# Patient Record
Sex: FEMALE | Race: OTHER | HISPANIC OR LATINO
[De-identification: names, ages, dates, MRNs, and addresses within clinical notes are randomized per-mention and may not be internally consistent; named-entity substitution may affect disease eponyms.]

---

## 2017-01-24 ENCOUNTER — RESULT REVIEW (OUTPATIENT)
Age: 27
End: 2017-01-24

## 2017-01-25 ENCOUNTER — APPOINTMENT (OUTPATIENT)
Dept: FAMILY MEDICINE | Facility: HOSPITAL | Age: 27
End: 2017-01-25

## 2017-01-25 ENCOUNTER — OUTPATIENT (OUTPATIENT)
Dept: OUTPATIENT SERVICES | Facility: HOSPITAL | Age: 27
LOS: 1 days | End: 2017-01-25
Payer: SELF-PAY

## 2017-01-25 VITALS
OXYGEN SATURATION: 100 % | DIASTOLIC BLOOD PRESSURE: 80 MMHG | TEMPERATURE: 98.4 F | HEART RATE: 89 BPM | WEIGHT: 115 LBS | SYSTOLIC BLOOD PRESSURE: 125 MMHG | BODY MASS INDEX: 21.73 KG/M2 | RESPIRATION RATE: 16 BRPM

## 2017-01-25 PROCEDURE — 87591 N.GONORRHOEAE DNA AMP PROB: CPT

## 2017-01-25 PROCEDURE — G0463: CPT

## 2017-01-25 PROCEDURE — 88175 CYTOPATH C/V AUTO FLUID REDO: CPT

## 2017-01-25 PROCEDURE — 87491 CHLMYD TRACH DNA AMP PROBE: CPT

## 2017-02-07 LAB — CYTOLOGY CVX/VAG DOC THIN PREP: NORMAL

## 2017-02-08 ENCOUNTER — OUTPATIENT (OUTPATIENT)
Dept: OUTPATIENT SERVICES | Facility: HOSPITAL | Age: 27
LOS: 1 days | End: 2017-02-08
Payer: SELF-PAY

## 2017-02-08 ENCOUNTER — APPOINTMENT (OUTPATIENT)
Dept: FAMILY MEDICINE | Facility: HOSPITAL | Age: 27
End: 2017-02-08

## 2017-02-08 ENCOUNTER — MED ADMIN CHARGE (OUTPATIENT)
Age: 27
End: 2017-02-08

## 2017-02-08 VITALS
DIASTOLIC BLOOD PRESSURE: 72 MMHG | RESPIRATION RATE: 14 BRPM | OXYGEN SATURATION: 100 % | HEIGHT: 61 IN | TEMPERATURE: 97.1 F | BODY MASS INDEX: 22.28 KG/M2 | HEART RATE: 83 BPM | SYSTOLIC BLOOD PRESSURE: 112 MMHG | WEIGHT: 118 LBS

## 2017-02-08 DIAGNOSIS — Z00.00 ENCOUNTER FOR GENERAL ADULT MEDICAL EXAMINATION W/OUT ABNORMAL FINDINGS: ICD-10-CM

## 2017-02-08 PROCEDURE — G0463: CPT

## 2017-11-01 ENCOUNTER — OUTPATIENT (OUTPATIENT)
Dept: OUTPATIENT SERVICES | Facility: HOSPITAL | Age: 27
LOS: 1 days | End: 2017-11-01
Payer: SELF-PAY

## 2017-11-01 ENCOUNTER — APPOINTMENT (OUTPATIENT)
Dept: FAMILY MEDICINE | Facility: HOSPITAL | Age: 27
End: 2017-11-01

## 2017-11-01 VITALS
SYSTOLIC BLOOD PRESSURE: 123 MMHG | HEART RATE: 86 BPM | RESPIRATION RATE: 16 BRPM | BODY MASS INDEX: 22.67 KG/M2 | DIASTOLIC BLOOD PRESSURE: 81 MMHG | OXYGEN SATURATION: 100 % | WEIGHT: 120 LBS | TEMPERATURE: 98.8 F

## 2017-11-01 PROCEDURE — 87070 CULTURE OTHR SPECIMN AEROBIC: CPT

## 2017-11-01 PROCEDURE — 36415 COLL VENOUS BLD VENIPUNCTURE: CPT

## 2017-11-01 PROCEDURE — 87563 M. GENITALIUM AMP PROBE: CPT

## 2017-11-01 PROCEDURE — 87389 HIV-1 AG W/HIV-1&-2 AB AG IA: CPT

## 2017-11-01 PROCEDURE — 86593 SYPHILIS TEST NON-TREP QUANT: CPT

## 2017-11-01 PROCEDURE — 87491 CHLMYD TRACH DNA AMP PROBE: CPT

## 2017-11-01 PROCEDURE — 87801 DETECT AGNT MULT DNA AMPLI: CPT

## 2017-11-01 PROCEDURE — 87340 HEPATITIS B SURFACE AG IA: CPT

## 2017-11-01 PROCEDURE — 87798 DETECT AGENT NOS DNA AMP: CPT

## 2017-11-01 PROCEDURE — 80074 ACUTE HEPATITIS PANEL: CPT

## 2017-11-01 PROCEDURE — 86696 HERPES SIMPLEX TYPE 2 TEST: CPT

## 2017-11-01 PROCEDURE — 86695 HERPES SIMPLEX TYPE 1 TEST: CPT

## 2017-11-01 PROCEDURE — 87591 N.GONORRHOEAE DNA AMP PROB: CPT

## 2017-11-01 PROCEDURE — G0463: CPT

## 2017-11-08 ENCOUNTER — OUTPATIENT (OUTPATIENT)
Dept: OUTPATIENT SERVICES | Facility: HOSPITAL | Age: 27
LOS: 1 days | End: 2017-11-08
Payer: SELF-PAY

## 2017-11-08 ENCOUNTER — APPOINTMENT (OUTPATIENT)
Dept: FAMILY MEDICINE | Facility: HOSPITAL | Age: 27
End: 2017-11-08

## 2017-11-08 VITALS
WEIGHT: 121 LBS | DIASTOLIC BLOOD PRESSURE: 75 MMHG | HEIGHT: 61 IN | OXYGEN SATURATION: 100 % | TEMPERATURE: 98.5 F | BODY MASS INDEX: 22.84 KG/M2 | SYSTOLIC BLOOD PRESSURE: 117 MMHG

## 2017-11-08 PROCEDURE — G0463: CPT

## 2019-10-30 ENCOUNTER — OUTPATIENT (OUTPATIENT)
Dept: OUTPATIENT SERVICES | Facility: HOSPITAL | Age: 29
LOS: 1 days | End: 2019-10-30
Payer: SELF-PAY

## 2019-10-30 ENCOUNTER — MED ADMIN CHARGE (OUTPATIENT)
Age: 29
End: 2019-10-30

## 2019-10-30 ENCOUNTER — APPOINTMENT (OUTPATIENT)
Dept: FAMILY MEDICINE | Facility: HOSPITAL | Age: 29
End: 2019-10-30

## 2019-10-30 VITALS — BODY MASS INDEX: 23.05 KG/M2 | WEIGHT: 122 LBS

## 2019-10-30 DIAGNOSIS — Z00.00 ENCOUNTER FOR GENERAL ADULT MEDICAL EXAMINATION WITHOUT ABNORMAL FINDINGS: ICD-10-CM

## 2019-10-30 PROCEDURE — G0463: CPT

## 2019-10-30 RX ORDER — MEDROXYPROGESTERONE ACETATE 150 MG/ML
150 INJECTION, SUSPENSION INTRAMUSCULAR
Qty: 1 | Refills: 0 | Status: COMPLETED | COMMUNITY
Start: 2017-02-08 | End: 2019-10-30

## 2019-10-30 RX ORDER — VALACYCLOVIR 1 G/1
1 TABLET, FILM COATED ORAL
Qty: 20 | Refills: 0 | Status: COMPLETED | COMMUNITY
Start: 2017-11-01 | End: 2019-10-30

## 2019-10-30 NOTE — ASSESSMENT
[FreeTextEntry1] : 29 year old female as above\par \par #blepharitis/stye\par - apply topical erythromycin as directed \par - f/u with opthalmology \par - apply warm compress to affected are as directed\par - if you experience any eye pain, changes in vision, or no improvement with topical abx please go to ED or RTC\par \par \par #HCM\par - f/u in 1 month for CPE with PCP\par \par \par \par case discussed with Dr. Arredondo

## 2019-10-30 NOTE — PHYSICAL EXAM
[No Acute Distress] : no acute distress [Well Nourished] : well nourished [Well Developed] : well developed [Well-Appearing] : well-appearing [Normal Sclera/Conjunctiva] : normal sclera/conjunctiva [PERRL] : pupils equal round and reactive to light [EOMI] : extraocular movements intact [No Respiratory Distress] : no respiratory distress  [No Accessory Muscle Use] : no accessory muscle use [Clear to Auscultation] : lungs were clear to auscultation bilaterally [Normal Rate] : normal rate  [Regular Rhythm] : with a regular rhythm [Normal S1, S2] : normal S1 and S2 [Normal Gait] : normal gait [Normal Affect] : the affect was normal [Normal Insight/Judgement] : insight and judgment were intact [de-identified] : left upper eyelid mild erythema and swelling, no tenderness on palpation or discharge from eyelid, stye in lower eyelid or eye. no conjuctiva inejction in b/l eyes, no b/l eye tearing, or vision changes.

## 2019-10-30 NOTE — REVIEW OF SYSTEMS
[Negative] : Heme/Lymph [Discharge] : no discharge [Pain] : no pain [Redness] : no redness [Dryness] : no dryness  [Vision Problems] : no vision problems [Itching] : no itching [FreeTextEntry3] : left upper eyelid erythema and left lower eyelid stye

## 2019-10-30 NOTE — HISTORY OF PRESENT ILLNESS
[FreeTextEntry8] : 29 year old female here c/o of left eye upper eye lid erythema and swellling x 1 month and left lower eye bump x 1 month. Pt denies any eye pain, diffuclty in vision, eye dsicharge or eye tearing. Pt denies taking any otc medication or applying any warm compress. Pt denies any fever, chills, headache, otalgia, rhinorrhea, sore throat cough, sob, wheezing, chest pain, palpitations, n/v, abdominal pain, diarrhea or constipation. Pt LMP was 10/1/19 and is not on any ocp but uses condoms.\par \par : [id number]

## 2019-10-31 DIAGNOSIS — H01.009 UNSPECIFIED BLEPHARITIS UNSPECIFIED EYE, UNSPECIFIED EYELID: ICD-10-CM

## 2019-12-06 ENCOUNTER — APPOINTMENT (OUTPATIENT)
Dept: OPHTHALMOLOGY | Facility: CLINIC | Age: 29
End: 2019-12-06

## 2020-01-07 ENCOUNTER — APPOINTMENT (OUTPATIENT)
Dept: OPHTHALMOLOGY | Facility: CLINIC | Age: 30
End: 2020-01-07

## 2021-09-13 ENCOUNTER — EMERGENCY (EMERGENCY)
Facility: HOSPITAL | Age: 31
LOS: 1 days | Discharge: HOME | End: 2021-09-13
Attending: EMERGENCY MEDICINE | Admitting: INTERNAL MEDICINE
Payer: MEDICAID

## 2021-09-13 VITALS
TEMPERATURE: 98 F | RESPIRATION RATE: 17 BRPM | WEIGHT: 126.1 LBS | SYSTOLIC BLOOD PRESSURE: 134 MMHG | OXYGEN SATURATION: 100 % | DIASTOLIC BLOOD PRESSURE: 91 MMHG | HEART RATE: 88 BPM

## 2021-09-13 PROCEDURE — 99282 EMERGENCY DEPT VISIT SF MDM: CPT | Mod: 25

## 2021-09-13 PROCEDURE — 12001 RPR S/N/AX/GEN/TRNK 2.5CM/<: CPT

## 2021-09-13 PROCEDURE — 99283 EMERGENCY DEPT VISIT LOW MDM: CPT | Mod: 25

## 2021-09-13 NOTE — ED PROVIDER NOTE - CPE EDP EYES NORM
DATE: 11/13/2019



EXAM: MAMMO MELITON SCREENING BILATERAL



HISTORY: Routine screening



COMPARISON: 5/19/2017 screen mammogram



This study was interpreted with the benefit of Computerized Aided Detection

(CAD).





Breast Density: FATTY The breast parenchyma is primarily fatty replaced.

Breast parenchyma level density A.





FINDINGS: Interval increased right upper-outer breast calcifications are

evident. Some of these calcifications are coarse while others are punctate. 

Questionable distortion or scarring involving the right upper breast on the

MLO projection particular is evident slightly inferior to these

calcifications. No dominant mass. Right axillary lymph nodes are new in the

interval but have a benign appearance. No suspicious left breast findings.





IMPRESSION: New calcifications and possible distortion at the right upper

outer breast.







BI-RADS CATEGORY: 0 INCOMPLETE: NEEDS ADDITIONAL IMAGING EVALUATION AND/OR

PRIOR MAMMOGRAMS FOR COMPARISON.



RECOMMENDED FOLLOW-UP: ADD ADDITIONAL IMAGING. Spot magnification imaging of

the right upper-outer breast is recommended. Medial lateral view of the right

breast or lateral medial view of the right breast is recommended. Ultrasound

may be needed.



PQRS compliance statement: Patient information was entered into a reminder

system with a target due date for the next mammogram.



Mammography is a sensitive method for finding small breast cancers, but it

does not detect them all and is not a substitute for careful clinical

examination.  A negative mammogram does not negate a clinically suspicious

finding and should not result in delay in biopsying a clinically suspicious

abnormality.



"Our facility is accredited by the [employer] Mammography

Program." normal...

## 2021-09-13 NOTE — ED PROVIDER NOTE - OBJECTIVE STATEMENT
32 y/o F no pmh pw laceration to the 3th left digit radial side mid phalanx  Last Tdap: 32 y/o F no pmh pw laceration to the 3th left digit radial side mid phalanx cut with a knife just PTA  Last Tdap: 2 years ago  right hand dominant

## 2021-09-13 NOTE — ED PROVIDER NOTE - ATTENDING CONTRIBUTION TO CARE
pt accidentally cut L middle finger with knife while cooking tonight just pta.  assoc   c pain /bleeding. no numbness/weakness    exam:   General: well appearing, NAD.   neuro: a&ox3, cn2-12 intact, CAPPS, 5/5 strength c nl sensation all extremities, nl coordination.   MSK: no extremity swelling.  Skin: L 3rd finger with linear lac 1.5cm radial side over mid phalanx. MCP/PIP/DIP 5/5 strength extension and flexion. fds/fdp intact. cap refill normal/brisk. +gross distal sensation    AP: L 3rd finger lac. no tendon injury. local wound care. dermabond closure

## 2021-09-13 NOTE — ED PROVIDER NOTE - PATIENT PORTAL LINK FT
You can access the FollowMyHealth Patient Portal offered by [facility] by registering at the following website: [email or web address]. By joining [facility]’s FollowMyHealth portal, you will also be able to view your health information using other applications (apps) compatible with our system.

## 2021-09-13 NOTE — ED ADULT NURSE NOTE - CCCP TRG CHIEF CMPLNT
"Chief Complaint   Patient presents with     RECHECK       Initial /76  Pulse 81  Temp 98.3  F (36.8  C) (Oral)  Ht 5' 8\" (1.727 m)  Wt 225 lb (102.1 kg)  SpO2 97%  BMI 34.21 kg/m2 Estimated body mass index is 34.21 kg/(m^2) as calculated from the following:    Height as of this encounter: 5' 8\" (1.727 m).    Weight as of this encounter: 225 lb (102.1 kg)..  BP completed using cuff size: brigette Mercedes MA  "
lacerations

## 2021-09-13 NOTE — ED PROVIDER NOTE - CLINICAL SUMMARY MEDICAL DECISION MAKING FREE TEXT BOX
32 y/o F no pmh pw laceration to the 3th left digit radial side mid phalanx  Last Tdap:               Wound very superficial   Plan- Wound care, Dermabond 30 y/o F no pmh pw laceration to the 3th left digit radial side mid phalanx cut with a knife just PTA  Last Tdap: 2 years ago. Right hand dominant. Wound very superficial   Plan- Wound care, Dermabond

## 2022-04-28 ENCOUNTER — APPOINTMENT (OUTPATIENT)
Dept: FAMILY MEDICINE | Facility: HOSPITAL | Age: 32
End: 2022-04-28

## 2022-04-28 ENCOUNTER — OUTPATIENT (OUTPATIENT)
Dept: OUTPATIENT SERVICES | Facility: HOSPITAL | Age: 32
LOS: 1 days | End: 2022-04-28
Payer: SELF-PAY

## 2022-04-28 ENCOUNTER — RESULT CHARGE (OUTPATIENT)
Age: 32
End: 2022-04-28

## 2022-04-28 DIAGNOSIS — Z00.00 ENCOUNTER FOR GENERAL ADULT MEDICAL EXAMINATION WITHOUT ABNORMAL FINDINGS: ICD-10-CM

## 2022-04-28 PROBLEM — Z78.9 OTHER SPECIFIED HEALTH STATUS: Chronic | Status: ACTIVE | Noted: 2021-09-13

## 2022-04-28 PROCEDURE — G0463: CPT

## 2022-04-29 LAB — SARS-COV-2 RNA CT RESP QN NAA+PROBE: NEGATIVE

## 2022-05-02 NOTE — HISTORY OF PRESENT ILLNESS
[Other Location: e.g. School (Enter Location, City,State)___] : at [unfilled], at the time of the visit. [Verbal consent obtained from patient] : the patient, [unfilled] [Medical Office: ([facility])___] : at the medical office located in  [Time Spent: ___ minutes] : I have spent [unfilled] minutes with the patient on the telephone [FreeTextEntry1] : 33 y/o female presents to clinic for Covid 19 screening 2/2 to possible exposure. Patient states that she has had headache since 4 days ago associated with chills, myalgias, fevers and some cough. Pateint states that some family members have also been experiencing similar symptoms.  of note, patient had covid 19 on January 2022. Received a booster 2 weeks ago. Denies  chills, n/v, SOB, CP, urinary /bowel changes.\par

## 2022-05-05 DIAGNOSIS — Z11.52 ENCOUNTER FOR SCREENING FOR COVID-19: ICD-10-CM

## 2022-05-06 ENCOUNTER — RESULT CHARGE (OUTPATIENT)
Age: 32
End: 2022-05-06

## 2022-05-07 ENCOUNTER — RESULT CHARGE (OUTPATIENT)
Age: 32
End: 2022-05-07

## 2022-05-07 ENCOUNTER — OUTPATIENT (OUTPATIENT)
Dept: OUTPATIENT SERVICES | Facility: HOSPITAL | Age: 32
LOS: 1 days | End: 2022-05-07
Payer: SELF-PAY

## 2022-05-07 ENCOUNTER — APPOINTMENT (OUTPATIENT)
Dept: FAMILY MEDICINE | Facility: HOSPITAL | Age: 32
End: 2022-05-07
Payer: SELF-PAY

## 2022-05-07 VITALS
SYSTOLIC BLOOD PRESSURE: 134 MMHG | WEIGHT: 130 LBS | DIASTOLIC BLOOD PRESSURE: 85 MMHG | RESPIRATION RATE: 14 BRPM | TEMPERATURE: 98 F | BODY MASS INDEX: 24.55 KG/M2 | OXYGEN SATURATION: 99 % | HEIGHT: 61 IN | HEART RATE: 89 BPM

## 2022-05-07 DIAGNOSIS — Z86.69 PERSONAL HISTORY OF OTHER DISEASES OF THE NERVOUS SYSTEM AND SENSE ORGANS: ICD-10-CM

## 2022-05-07 DIAGNOSIS — Z87.42 PERSONAL HISTORY OF OTHER DISEASES OF THE FEMALE GENITAL TRACT: ICD-10-CM

## 2022-05-07 DIAGNOSIS — Z11.52 ENCOUNTER FOR SCREENING FOR COVID-19: ICD-10-CM

## 2022-05-07 DIAGNOSIS — N39.0 URINARY TRACT INFECTION, SITE NOT SPECIFIED: ICD-10-CM

## 2022-05-07 DIAGNOSIS — Z86.59 PERSONAL HISTORY OF OTHER MENTAL AND BEHAVIORAL DISORDERS: ICD-10-CM

## 2022-05-07 DIAGNOSIS — Z00.00 ENCOUNTER FOR GENERAL ADULT MEDICAL EXAMINATION WITHOUT ABNORMAL FINDINGS: ICD-10-CM

## 2022-05-07 DIAGNOSIS — H00.019 HORDEOLUM EXTERNUM UNSPECIFIED EYE, UNSPECIFIED EYELID: ICD-10-CM

## 2022-05-07 DIAGNOSIS — Z92.29 PERSONAL HISTORY OF OTHER DRUG THERAPY: ICD-10-CM

## 2022-05-07 PROCEDURE — 93005 ELECTROCARDIOGRAM TRACING: CPT

## 2022-05-07 PROCEDURE — G0463: CPT

## 2022-05-07 PROCEDURE — 93010 ELECTROCARDIOGRAM REPORT: CPT

## 2022-05-07 PROCEDURE — 87186 SC STD MICRODIL/AGAR DIL: CPT

## 2022-05-07 PROCEDURE — 87086 URINE CULTURE/COLONY COUNT: CPT

## 2022-05-09 LAB
BILIRUB UR QL STRIP: NORMAL
CLARITY UR: CLEAR
GLUCOSE UR-MCNC: NORMAL
HCG UR QL: 0.2 EU/DL
HGB UR QL STRIP.AUTO: NORMAL
KETONES UR-MCNC: NORMAL
LEUKOCYTE ESTERASE UR QL STRIP: NORMAL
NITRITE UR QL STRIP: NORMAL
PH UR STRIP: 7
PROT UR STRIP-MCNC: NORMAL
SP GR UR STRIP: 1.01

## 2022-05-09 NOTE — HISTORY OF PRESENT ILLNESS
[FreeTextEntry8] : 31 y/o  presents to clinic due to multiple symptoms associated with Covid 19. Pt had covid 1st week of 2022, positive in med station. Patient experienced  cough, chest pressure, fever, body aches, headache for about a week. Two days of those 2 weeks had constant chest pressure, non radiating. In April had booster and started having similar symptoms without cough for 2 day, had chest pressure again. Now last week on  patient experienced fevers, back pain neck pain and chest pressure which lasted 2 days. Patient now without symptoms. Patient states she feels well but was worried of having similar symptoms in the future.  LMP: 22.\par  Of note patient with some urine frequency, no urgency or dysuria. Denies fevers, chills, n/v, cough, sick contacts, SOB, CP, urinary /bowel changes.\par \par \par currently vaccinated 2021, 2021, booster 2022\par \par PMH: none\par Surgeries: no\par FH: Mother HTN\par \par \par

## 2022-05-09 NOTE — PLAN
[FreeTextEntry1] : #Chest pressure sensation\par - may be related to covid?\par - EKG done in clinic with NSR with no evidence of ischemic changes\par - Asymptomatic at the moment\par -Cardio referral given for evaluation for holter monitor\par \par #Increased Frequency\par - u/a with leuk\par - Will culture urine\par - GC/Chlamydia also done in urine\par \par rtc for cardio clinic and with PCP for CPE. Patient to call for appointment.\par \par d/w Dr. Paige\par \par

## 2022-05-10 DIAGNOSIS — Z11.3 ENCOUNTER FOR SCREENING FOR INFECTIONS WITH A PREDOMINANTLY SEXUAL MODE OF TRANSMISSION: ICD-10-CM

## 2022-05-10 DIAGNOSIS — R35.0 FREQUENCY OF MICTURITION: ICD-10-CM

## 2022-05-10 DIAGNOSIS — R07.89 OTHER CHEST PAIN: ICD-10-CM

## 2022-05-11 LAB
C TRACH RRNA SPEC QL NAA+PROBE: NOT DETECTED
N GONORRHOEA RRNA SPEC QL NAA+PROBE: NOT DETECTED
SOURCE AMPLIFICATION: NORMAL

## 2022-05-20 PROBLEM — N39.0 UTI (URINARY TRACT INFECTION): Status: RESOLVED | Noted: 2022-05-20 | Resolved: 2022-06-19

## 2022-05-20 LAB — BACTERIA UR CULT: ABNORMAL

## 2022-07-08 ENCOUNTER — APPOINTMENT (OUTPATIENT)
Dept: FAMILY MEDICINE | Facility: HOSPITAL | Age: 32
End: 2022-07-08

## 2022-07-08 ENCOUNTER — OUTPATIENT (OUTPATIENT)
Dept: OUTPATIENT SERVICES | Facility: HOSPITAL | Age: 32
LOS: 1 days | End: 2022-07-08
Payer: SELF-PAY

## 2022-07-08 VITALS
DIASTOLIC BLOOD PRESSURE: 68 MMHG | WEIGHT: 133 LBS | TEMPERATURE: 97.9 F | BODY MASS INDEX: 25.11 KG/M2 | SYSTOLIC BLOOD PRESSURE: 111 MMHG | HEART RATE: 68 BPM | OXYGEN SATURATION: 99 % | RESPIRATION RATE: 16 BRPM | HEIGHT: 61 IN

## 2022-07-08 DIAGNOSIS — L85.3 XEROSIS CUTIS: ICD-10-CM

## 2022-07-08 DIAGNOSIS — Z00.00 ENCOUNTER FOR GENERAL ADULT MEDICAL EXAMINATION WITHOUT ABNORMAL FINDINGS: ICD-10-CM

## 2022-07-08 PROCEDURE — G0463: CPT

## 2022-07-08 RX ORDER — ERYTHROMYCIN 20 MG/G
2 GEL TOPICAL TWICE DAILY
Qty: 1 | Refills: 0 | Status: DISCONTINUED | COMMUNITY
Start: 2019-10-30 | End: 2022-07-08

## 2022-07-08 RX ORDER — CEPHALEXIN 500 MG/1
500 CAPSULE ORAL
Qty: 10 | Refills: 0 | Status: DISCONTINUED | COMMUNITY
Start: 2022-05-20 | End: 2022-07-08

## 2022-07-09 PROBLEM — L85.3 DRY SKIN: Status: RESOLVED | Noted: 2022-07-08 | Resolved: 2022-07-09

## 2022-07-10 NOTE — ASSESSMENT
[FreeTextEntry1] : *Above discussed w Dr. Arredondo\par \par -RTC in 1 month for CPE where a repeat PAP will be obtained

## 2022-07-10 NOTE — PHYSICAL EXAM
[Normal] : no acute distress, well nourished, well developed and well-appearing [de-identified] : Epidermal skin sloughing off B/L palms, located to thenar eminence.

## 2022-07-10 NOTE — HISTORY OF PRESENT ILLNESS
[FreeTextEntry8] : 33 yo F presenting acutely for skin peeling off hands. Pt states that 1 week ago, she noticed skin peeling off thenar eminence of B/L palms. Denies associated fevers, chills, rash, cough, SOB or fatigue. 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 Associated w pruritus. Denies pain, burning or numbness.

## 2022-07-10 NOTE — REVIEW OF SYSTEMS
[Itching] : Itching [Skin Rash] : skin rash [Negative] : Constitutional [Nail Changes] : no nail changes [Mole Changes] : no mole changes [Hair Changes] : no hair changes

## 2022-07-14 DIAGNOSIS — L85.3 XEROSIS CUTIS: ICD-10-CM

## 2022-08-06 ENCOUNTER — APPOINTMENT (OUTPATIENT)
Dept: FAMILY MEDICINE | Facility: HOSPITAL | Age: 32
End: 2022-08-06

## 2022-08-06 ENCOUNTER — OUTPATIENT (OUTPATIENT)
Dept: OUTPATIENT SERVICES | Facility: HOSPITAL | Age: 32
LOS: 1 days | End: 2022-08-06
Payer: SELF-PAY

## 2022-08-06 VITALS
SYSTOLIC BLOOD PRESSURE: 133 MMHG | WEIGHT: 132 LBS | RESPIRATION RATE: 15 BRPM | TEMPERATURE: 97.8 F | OXYGEN SATURATION: 99 % | BODY MASS INDEX: 24.94 KG/M2 | HEART RATE: 83 BPM | DIASTOLIC BLOOD PRESSURE: 83 MMHG

## 2022-08-06 DIAGNOSIS — Z12.4 ENCOUNTER FOR SCREENING FOR MALIGNANT NEOPLASM OF CERVIX: ICD-10-CM

## 2022-08-06 DIAGNOSIS — B35.2 TINEA MANUUM: ICD-10-CM

## 2022-08-06 DIAGNOSIS — Z00.00 ENCOUNTER FOR GENERAL ADULT MEDICAL EXAMINATION WITHOUT ABNORMAL FINDINGS: ICD-10-CM

## 2022-08-06 DIAGNOSIS — Z87.898 PERSONAL HISTORY OF OTHER SPECIFIED CONDITIONS: ICD-10-CM

## 2022-08-06 DIAGNOSIS — Z11.3 ENCOUNTER FOR SCREENING FOR INFECTIONS WITH A PREDOMINANTLY SEXUAL MODE OF TRANSMISSION: ICD-10-CM

## 2022-08-06 DIAGNOSIS — Z86.19 PERSONAL HISTORY OF OTHER INFECTIOUS AND PARASITIC DISEASES: ICD-10-CM

## 2022-08-06 PROCEDURE — 86803 HEPATITIS C AB TEST: CPT

## 2022-08-06 PROCEDURE — 85025 COMPLETE CBC W/AUTO DIFF WBC: CPT

## 2022-08-06 PROCEDURE — 84443 ASSAY THYROID STIM HORMONE: CPT

## 2022-08-06 PROCEDURE — 87624 HPV HI-RISK TYP POOLED RSLT: CPT

## 2022-08-06 PROCEDURE — 80061 LIPID PANEL: CPT

## 2022-08-06 PROCEDURE — G0463: CPT

## 2022-08-06 PROCEDURE — 80053 COMPREHEN METABOLIC PANEL: CPT

## 2022-08-06 NOTE — HEALTH RISK ASSESSMENT
[Good] : ~his/her~  mood as  good [Never] : Never [Yes] : Yes [Monthly or less (1 pt)] : Monthly or less (1 point) [1 or 2 (0 pts)] : 1 or 2 (0 points) [Never (0 pts)] : Never (0 points) [No] : In the past 12 months have you used drugs other than those required for medical reasons? No [No falls in past year] : Patient reported no falls in the past year [0] : 2) Feeling down, depressed, or hopeless: Not at all (0) [PHQ-2 Negative - No further assessment needed] : PHQ-2 Negative - No further assessment needed [HIV Test offered] : HIV Test offered [Hepatitis C test offered] : Hepatitis C test offered [None] : None [With Family] : lives with family [Employed] : employed [Single] : single [Sexually Active] : sexually active [Feels Safe at Home] : Feels safe at home [Fully functional (bathing, dressing, toileting, transferring, walking, feeding)] : Fully functional (bathing, dressing, toileting, transferring, walking, feeding) [Fully functional (using the telephone, shopping, preparing meals, housekeeping, doing laundry, using] : Fully functional and needs no help or supervision to perform IADLs (using the telephone, shopping, preparing meals, housekeeping, doing laundry, using transportation, managing medications and managing finances) [Smoke Detector] : smoke detector [Carbon Monoxide Detector] : carbon monoxide detector [Safety elements used in home] : safety elements used in home [Seat Belt] :  uses seat belt [Sunscreen] : uses sunscreen [Audit-CScore] : 1 [de-identified] : Likes exercise routines [de-identified] : Varied diet [[id number]] : 0 [Change in mental status noted] : No change in mental status noted [Language] : denies difficulty with language [High Risk Behavior] : no high risk behavior [Reports changes in hearing] : Reports no changes in hearing [Reports changes in vision] : Reports no changes in vision [Reports changes in dental health] : Reports no changes in dental health [Guns at Home] : no guns at home [Travel to Developing Areas] : does not  travel to developing areas [TB Exposure] : is not being exposed to tuberculosis [Caregiver Concerns] : does not have caregiver concerns [PapSmearDate] : 08/06/22 [FreeTextEntry2] :

## 2022-08-06 NOTE — PHYSICAL EXAM
[No Acute Distress] : no acute distress [Well Nourished] : well nourished [Well Developed] : well developed [Well-Appearing] : well-appearing [Normal Sclera/Conjunctiva] : normal sclera/conjunctiva [PERRL] : pupils equal round and reactive to light [EOMI] : extraocular movements intact [Normal Outer Ear/Nose] : the outer ears and nose were normal in appearance [Normal Oropharynx] : the oropharynx was normal [No JVD] : no jugular venous distention [No Lymphadenopathy] : no lymphadenopathy [Supple] : supple [Thyroid Normal, No Nodules] : the thyroid was normal and there were no nodules present [No Respiratory Distress] : no respiratory distress  [No Accessory Muscle Use] : no accessory muscle use [Clear to Auscultation] : lungs were clear to auscultation bilaterally [Normal Rate] : normal rate  [Regular Rhythm] : with a regular rhythm [Normal S1, S2] : normal S1 and S2 [No Murmur] : no murmur heard [No Carotid Bruits] : no carotid bruits [No Abdominal Bruit] : a ~M bruit was not heard ~T in the abdomen [No Varicosities] : no varicosities [Pedal Pulses Present] : the pedal pulses are present [No Edema] : there was no peripheral edema [No Palpable Aorta] : no palpable aorta [No Extremity Clubbing/Cyanosis] : no extremity clubbing/cyanosis [Soft] : abdomen soft [Non Tender] : non-tender [Non-distended] : non-distended [No Masses] : no abdominal mass palpated [No HSM] : no HSM [Normal Bowel Sounds] : normal bowel sounds [External Female Genitalia] : normal external genitalia [Vagina] : normal vaginal exam [Cervix] : normal cervix [Uterine Adnexae] : normal adnexa [Uterus] : uterus was normal size, without masses or tenderness [Normal Posterior Cervical Nodes] : no posterior cervical lymphadenopathy [Normal Anterior Cervical Nodes] : no anterior cervical lymphadenopathy [No CVA Tenderness] : no CVA  tenderness [No Spinal Tenderness] : no spinal tenderness [No Joint Swelling] : no joint swelling [Grossly Normal Strength/Tone] : grossly normal strength/tone [No Rash] : no rash [No Focal Deficits] : no focal deficits [Coordination Grossly Intact] : coordination grossly intact [Normal Gait] : normal gait [Deep Tendon Reflexes (DTR)] : deep tendon reflexes were 2+ and symmetric [Normal Affect] : the affect was normal [Normal Insight/Judgement] : insight and judgment were intact [FreeTextEntry1] : PAP w HPV performed

## 2022-08-06 NOTE — PAST MEDICAL HISTORY
[Menstruating] : menstruating [Definite ___ (Date)] : the last menstrual period was [unfilled] [Normal Amount/Duration] : it was of a normal amount and duration [Normal Duration] : the duration was normal [Regular Cycle Intervals] : have been regular [Total Preg ___] : G[unfilled] [Live Births ___] : P[unfilled]  [Abortions ___] : Abortions:[unfilled] [Living ___] : Living: [unfilled]

## 2022-08-06 NOTE — HISTORY OF PRESENT ILLNESS
[FreeTextEntry1] : Physical [de-identified] : 33 yo F presenting for annual physical and cervical cancer screening. \par -Denies any acute complaints.

## 2022-08-10 DIAGNOSIS — Z12.9 ENCOUNTER FOR SCREENING FOR MALIGNANT NEOPLASM, SITE UNSPECIFIED: ICD-10-CM

## 2022-08-24 LAB
ALBUMIN SERPL ELPH-MCNC: 4.4 G/DL
ALP BLD-CCNC: 67 U/L
ALT SERPL-CCNC: 22 U/L
ANION GAP SERPL CALC-SCNC: 12 MMOL/L
AST SERPL-CCNC: 23 U/L
BASOPHILS # BLD AUTO: 0.03 K/UL
BASOPHILS NFR BLD AUTO: 0.5 %
BILIRUB SERPL-MCNC: 0.5 MG/DL
BUN SERPL-MCNC: 12 MG/DL
CALCIUM SERPL-MCNC: 9.2 MG/DL
CHLORIDE SERPL-SCNC: 107 MMOL/L
CO2 SERPL-SCNC: 23 MMOL/L
CREAT SERPL-MCNC: 0.85 MG/DL
CYTOLOGY CVX/VAG DOC THIN PREP: NORMAL
EGFR: 92 ML/MIN/1.73M2
EOSINOPHIL # BLD AUTO: 0.07 K/UL
EOSINOPHIL NFR BLD AUTO: 1.1 %
GLUCOSE SERPL-MCNC: 92 MG/DL
HCT VFR BLD CALC: 37.3 %
HCV AB SER QL: NONREACTIVE
HCV S/CO RATIO: 0.15 S/CO
HGB BLD-MCNC: 12.4 G/DL
HPV HIGH+LOW RISK DNA PNL CVX: NOT DETECTED
IMM GRANULOCYTES NFR BLD AUTO: 0.2 %
LYMPHOCYTES # BLD AUTO: 1.41 K/UL
LYMPHOCYTES NFR BLD AUTO: 22.5 %
MAN DIFF?: NORMAL
MCHC RBC-ENTMCNC: 30.4 PG
MCHC RBC-ENTMCNC: 33.2 GM/DL
MCV RBC AUTO: 91.4 FL
MONOCYTES # BLD AUTO: 0.54 K/UL
MONOCYTES NFR BLD AUTO: 8.6 %
NEUTROPHILS # BLD AUTO: 4.22 K/UL
NEUTROPHILS NFR BLD AUTO: 67.1 %
PLATELET # BLD AUTO: 308 K/UL
POTASSIUM SERPL-SCNC: 3.9 MMOL/L
PROT SERPL-MCNC: 6.8 G/DL
RBC # BLD: 4.08 M/UL
RBC # FLD: 12.7 %
SODIUM SERPL-SCNC: 142 MMOL/L
TSH SERPL-ACNC: 1.92 UIU/ML
WBC # FLD AUTO: 6.28 K/UL

## 2022-08-25 ENCOUNTER — NON-APPOINTMENT (OUTPATIENT)
Age: 32
End: 2022-08-25

## 2022-08-25 LAB
CHOLEST SERPL-MCNC: 182 MG/DL
HDLC SERPL-MCNC: 56 MG/DL
LDLC SERPL CALC-MCNC: 112 MG/DL
NONHDLC SERPL-MCNC: 127 MG/DL
TRIGL SERPL-MCNC: 74 MG/DL

## 2023-08-18 ENCOUNTER — OUTPATIENT (OUTPATIENT)
Dept: OUTPATIENT SERVICES | Facility: HOSPITAL | Age: 33
LOS: 1 days | End: 2023-08-18
Payer: SELF-PAY

## 2023-08-18 ENCOUNTER — APPOINTMENT (OUTPATIENT)
Dept: FAMILY MEDICINE | Facility: HOSPITAL | Age: 33
End: 2023-08-18
Payer: COMMERCIAL

## 2023-08-18 VITALS
RESPIRATION RATE: 16 BRPM | BODY MASS INDEX: 24.37 KG/M2 | WEIGHT: 129 LBS | SYSTOLIC BLOOD PRESSURE: 116 MMHG | DIASTOLIC BLOOD PRESSURE: 77 MMHG | HEART RATE: 92 BPM | TEMPERATURE: 98.7 F | OXYGEN SATURATION: 97 %

## 2023-08-18 DIAGNOSIS — Z00.00 ENCOUNTER FOR GENERAL ADULT MEDICAL EXAMINATION WITHOUT ABNORMAL FINDINGS: ICD-10-CM

## 2023-08-18 PROCEDURE — 0225U NFCT DS DNA&RNA 21 SARSCOV2: CPT

## 2023-08-18 PROCEDURE — 93010 ELECTROCARDIOGRAM REPORT: CPT

## 2023-08-18 PROCEDURE — G0463: CPT

## 2023-08-18 PROCEDURE — 93005 ELECTROCARDIOGRAM TRACING: CPT

## 2023-08-18 NOTE — PLAN
[FreeTextEntry1] :  RVP ordered, will FU results, advised supportive care for now with prn Tylenol, salt water gargles. throat lozenges OTC   Chest Pain -pt had similar sxs last year when had COVID, ekg wnl, no changes on ekg today. Pain likely from viral illness.   FU in 1 month for cpe

## 2023-08-18 NOTE — HISTORY OF PRESENT ILLNESS
[Congestion] : congestion [Sore Throat] : sore throat [Mild] : mild [Sudden] : suddenly [___ Days ago] : [unfilled] days ago [Rest] : rest [Activity] : with activity [Stable] : stable [FreeTextEntry2] : chest pain on exertion, anxiety

## 2023-08-18 NOTE — REVIEW OF SYSTEMS
[Fever] : no fever [Chills] : no chills [Fatigue] : fatigue [Night Sweats] : no night sweats [Earache] : no earache [Hearing Loss] : no hearing loss [Hoarseness] : hoarseness [Nasal Discharge] : no nasal discharge [Sore Throat] : sore throat [Chest Pain] : chest pain [Palpitations] : no palpitations [Shortness Of Breath] : no shortness of breath [Cough] : no cough [Dyspnea on Exertion] : dyspnea on exertion [Negative] : Neurological

## 2023-08-20 ENCOUNTER — NON-APPOINTMENT (OUTPATIENT)
Age: 33
End: 2023-08-20

## 2023-08-21 LAB
HPIV2 RNA SPEC QL NAA+PROBE: DETECTED
RAPID RVP RESULT: DETECTED
SARS-COV-2 RNA PNL RESP NAA+PROBE: NOT DETECTED

## 2023-08-23 DIAGNOSIS — R07.89 OTHER CHEST PAIN: ICD-10-CM

## 2023-08-23 DIAGNOSIS — B34.9 VIRAL INFECTION, UNSPECIFIED: ICD-10-CM

## 2023-10-28 ENCOUNTER — APPOINTMENT (OUTPATIENT)
Dept: FAMILY MEDICINE | Facility: HOSPITAL | Age: 33
End: 2023-10-28

## 2023-11-10 ENCOUNTER — APPOINTMENT (OUTPATIENT)
Dept: FAMILY MEDICINE | Facility: HOSPITAL | Age: 33
End: 2023-11-10

## 2023-11-10 ENCOUNTER — OUTPATIENT (OUTPATIENT)
Dept: OUTPATIENT SERVICES | Facility: HOSPITAL | Age: 33
LOS: 1 days | End: 2023-11-10
Payer: SELF-PAY

## 2023-11-10 VITALS
HEART RATE: 88 BPM | OXYGEN SATURATION: 99 % | BODY MASS INDEX: 23.98 KG/M2 | TEMPERATURE: 98 F | SYSTOLIC BLOOD PRESSURE: 123 MMHG | HEIGHT: 61 IN | WEIGHT: 127 LBS | DIASTOLIC BLOOD PRESSURE: 80 MMHG | RESPIRATION RATE: 14 BRPM

## 2023-11-10 DIAGNOSIS — Z00.00 ENCOUNTER FOR GENERAL ADULT MEDICAL EXAMINATION WITHOUT ABNORMAL FINDINGS: ICD-10-CM

## 2023-11-10 DIAGNOSIS — N87.0 MILD CERVICAL DYSPLASIA: ICD-10-CM

## 2023-11-10 DIAGNOSIS — R07.89 OTHER CHEST PAIN: ICD-10-CM

## 2023-11-10 DIAGNOSIS — N89.8 OTHER SPECIFIED NONINFLAMMATORY DISORDERS OF VAGINA: ICD-10-CM

## 2023-11-10 DIAGNOSIS — Z86.19 PERSONAL HISTORY OF OTHER INFECTIOUS AND PARASITIC DISEASES: ICD-10-CM

## 2023-11-10 PROCEDURE — G0463: CPT

## 2023-11-10 PROCEDURE — 87800 DETECT AGNT MULT DNA DIREC: CPT

## 2023-11-10 PROCEDURE — 87624 HPV HI-RISK TYP POOLED RSLT: CPT

## 2023-11-11 ENCOUNTER — TRANSCRIPTION ENCOUNTER (OUTPATIENT)
Age: 33
End: 2023-11-11

## 2023-11-11 PROBLEM — R07.89 SENSATION OF CHEST PRESSURE: Status: RESOLVED | Noted: 2022-05-07 | Resolved: 2023-11-11

## 2023-11-11 PROBLEM — Z86.19 HISTORY OF VIRAL INFECTION: Status: RESOLVED | Noted: 2023-08-18 | Resolved: 2023-11-11

## 2023-11-13 LAB
C TRACH RRNA SPEC QL NAA+PROBE: NOT DETECTED
CANDIDA VAG CYTO: NOT DETECTED
G VAGINALIS+PREV SP MTYP VAG QL MICRO: DETECTED
HPV HIGH+LOW RISK DNA PNL CVX: NOT DETECTED
N GONORRHOEA RRNA SPEC QL NAA+PROBE: NOT DETECTED
SOURCE AMPLIFICATION: NORMAL
T VAGINALIS VAG QL WET PREP: NOT DETECTED

## 2023-11-13 NOTE — HEALTH RISK ASSESSMENT
Addended by: JENNY NELSON on: 11/13/2023 05:00 PM     Modules accepted: Orders     [0] : 2) Feeling down, depressed, or hopeless: Not at all (0) [[id number]] : 0

## 2023-11-20 LAB — CYTOLOGY CVX/VAG DOC THIN PREP: NORMAL

## 2023-12-02 ENCOUNTER — OUTPATIENT (OUTPATIENT)
Dept: OUTPATIENT SERVICES | Facility: HOSPITAL | Age: 33
LOS: 1 days | End: 2023-12-02
Payer: SELF-PAY

## 2023-12-02 ENCOUNTER — APPOINTMENT (OUTPATIENT)
Dept: FAMILY MEDICINE | Facility: HOSPITAL | Age: 33
End: 2023-12-02

## 2023-12-02 VITALS
OXYGEN SATURATION: 99 % | WEIGHT: 128 LBS | SYSTOLIC BLOOD PRESSURE: 111 MMHG | BODY MASS INDEX: 24.17 KG/M2 | HEIGHT: 61 IN | TEMPERATURE: 97.9 F | HEART RATE: 80 BPM | DIASTOLIC BLOOD PRESSURE: 74 MMHG | RESPIRATION RATE: 14 BRPM

## 2023-12-02 DIAGNOSIS — K59.01 SLOW TRANSIT CONSTIPATION: ICD-10-CM

## 2023-12-02 DIAGNOSIS — D22.9 MELANOCYTIC NEVI, UNSPECIFIED: ICD-10-CM

## 2023-12-02 DIAGNOSIS — Z00.00 ENCOUNTER FOR GENERAL ADULT MEDICAL EXAMINATION WITHOUT ABNORMAL FINDINGS: ICD-10-CM

## 2023-12-02 DIAGNOSIS — Z00.00 ENCOUNTER FOR GENERAL ADULT MEDICAL EXAMINATION W/OUT ABNORMAL FINDINGS: ICD-10-CM

## 2023-12-02 DIAGNOSIS — Z87.42 PERSONAL HISTORY OF OTHER DISEASES OF THE FEMALE GENITAL TRACT: ICD-10-CM

## 2023-12-02 PROCEDURE — G0463: CPT

## 2023-12-02 PROCEDURE — 80053 COMPREHEN METABOLIC PANEL: CPT

## 2023-12-02 PROCEDURE — 85025 COMPLETE CBC W/AUTO DIFF WBC: CPT

## 2023-12-02 RX ORDER — METRONIDAZOLE 7.5 MG/G
0.75 GEL VAGINAL
Qty: 5 | Refills: 0 | Status: COMPLETED | COMMUNITY
Start: 2023-11-10 | End: 2023-12-02

## 2023-12-02 RX ORDER — CLOTRIMAZOLE 10 MG/G
1 CREAM TOPICAL 3 TIMES DAILY
Qty: 1 | Refills: 0 | Status: COMPLETED | COMMUNITY
Start: 2022-07-08 | End: 2023-12-02

## 2023-12-04 LAB
ALBUMIN SERPL ELPH-MCNC: 4.7 G/DL
ALP BLD-CCNC: 76 U/L
ALT SERPL-CCNC: 28 U/L
ANION GAP SERPL CALC-SCNC: 12 MMOL/L
AST SERPL-CCNC: 22 U/L
BASOPHILS # BLD AUTO: 0.02 K/UL
BASOPHILS NFR BLD AUTO: 0.4 %
BILIRUB SERPL-MCNC: 0.4 MG/DL
BUN SERPL-MCNC: 15 MG/DL
CALCIUM SERPL-MCNC: 9.4 MG/DL
CHLORIDE SERPL-SCNC: 103 MMOL/L
CO2 SERPL-SCNC: 23 MMOL/L
CREAT SERPL-MCNC: 0.82 MG/DL
EGFR: 96 ML/MIN/1.73M2
EOSINOPHIL # BLD AUTO: 0.03 K/UL
EOSINOPHIL NFR BLD AUTO: 0.7 %
GLUCOSE SERPL-MCNC: 89 MG/DL
HCT VFR BLD CALC: 40.3 %
HGB BLD-MCNC: 13.4 G/DL
IMM GRANULOCYTES NFR BLD AUTO: 0.2 %
LYMPHOCYTES # BLD AUTO: 1.11 K/UL
LYMPHOCYTES NFR BLD AUTO: 24.7 %
MAN DIFF?: NORMAL
MCHC RBC-ENTMCNC: 30 PG
MCHC RBC-ENTMCNC: 33.3 GM/DL
MCV RBC AUTO: 90.4 FL
MONOCYTES # BLD AUTO: 0.48 K/UL
MONOCYTES NFR BLD AUTO: 10.7 %
NEUTROPHILS # BLD AUTO: 2.84 K/UL
NEUTROPHILS NFR BLD AUTO: 63.3 %
PLATELET # BLD AUTO: 314 K/UL
POTASSIUM SERPL-SCNC: 4.3 MMOL/L
PROT SERPL-MCNC: 7.5 G/DL
RBC # BLD: 4.46 M/UL
RBC # FLD: 12.6 %
SODIUM SERPL-SCNC: 139 MMOL/L
WBC # FLD AUTO: 4.49 K/UL

## 2024-04-26 ENCOUNTER — INPATIENT (INPATIENT)
Facility: HOSPITAL | Age: 34
LOS: 3 days | Discharge: HOME | DRG: 871 | End: 2024-04-30
Attending: FAMILY MEDICINE | Admitting: STUDENT IN AN ORGANIZED HEALTH CARE EDUCATION/TRAINING PROGRAM
Payer: MEDICAID

## 2024-04-26 VITALS
WEIGHT: 128.97 LBS | HEART RATE: 107 BPM | HEIGHT: 60 IN | DIASTOLIC BLOOD PRESSURE: 78 MMHG | OXYGEN SATURATION: 99 % | RESPIRATION RATE: 18 BRPM | TEMPERATURE: 98 F | SYSTOLIC BLOOD PRESSURE: 119 MMHG

## 2024-04-26 DIAGNOSIS — N12 TUBULO-INTERSTITIAL NEPHRITIS, NOT SPECIFIED AS ACUTE OR CHRONIC: ICD-10-CM

## 2024-04-26 DIAGNOSIS — Z78.9 OTHER SPECIFIED HEALTH STATUS: Chronic | ICD-10-CM

## 2024-04-26 LAB
ALBUMIN SERPL ELPH-MCNC: 3.7 G/DL — SIGNIFICANT CHANGE UP (ref 3.3–5)
ALP SERPL-CCNC: 73 U/L — SIGNIFICANT CHANGE UP (ref 40–120)
ALT FLD-CCNC: 43 U/L — SIGNIFICANT CHANGE UP (ref 10–45)
ANION GAP SERPL CALC-SCNC: 9 MMOL/L — SIGNIFICANT CHANGE UP (ref 5–17)
APPEARANCE UR: ABNORMAL
APTT BLD: 30.3 SEC — SIGNIFICANT CHANGE UP (ref 24.5–35.6)
AST SERPL-CCNC: 34 U/L — SIGNIFICANT CHANGE UP (ref 10–40)
BACTERIA # UR AUTO: ABNORMAL /HPF
BASOPHILS # BLD AUTO: 0.03 K/UL — SIGNIFICANT CHANGE UP (ref 0–0.2)
BASOPHILS NFR BLD AUTO: 0.2 % — SIGNIFICANT CHANGE UP (ref 0–2)
BILIRUB SERPL-MCNC: 0.7 MG/DL — SIGNIFICANT CHANGE UP (ref 0.2–1.2)
BILIRUB UR-MCNC: NEGATIVE — SIGNIFICANT CHANGE UP
BUN SERPL-MCNC: 9 MG/DL — SIGNIFICANT CHANGE UP (ref 7–23)
CALCIUM SERPL-MCNC: 8.9 MG/DL — SIGNIFICANT CHANGE UP (ref 8.4–10.5)
CHLORIDE SERPL-SCNC: 103 MMOL/L — SIGNIFICANT CHANGE UP (ref 96–108)
CO2 SERPL-SCNC: 26 MMOL/L — SIGNIFICANT CHANGE UP (ref 22–31)
COLOR SPEC: YELLOW — SIGNIFICANT CHANGE UP
CREAT SERPL-MCNC: 0.72 MG/DL — SIGNIFICANT CHANGE UP (ref 0.5–1.3)
DIFF PNL FLD: ABNORMAL
EGFR: 110 ML/MIN/1.73M2 — SIGNIFICANT CHANGE UP
EOSINOPHIL # BLD AUTO: 0 K/UL — SIGNIFICANT CHANGE UP (ref 0–0.5)
EOSINOPHIL NFR BLD AUTO: 0 % — SIGNIFICANT CHANGE UP (ref 0–6)
EPI CELLS # UR: 4 — SIGNIFICANT CHANGE UP
GLUCOSE SERPL-MCNC: 90 MG/DL — SIGNIFICANT CHANGE UP (ref 70–99)
GLUCOSE UR QL: NEGATIVE MG/DL — SIGNIFICANT CHANGE UP
HCG SERPL-ACNC: <1 MIU/ML — SIGNIFICANT CHANGE UP
HCT VFR BLD CALC: 38.9 % — SIGNIFICANT CHANGE UP (ref 34.5–45)
HGB BLD-MCNC: 13.3 G/DL — SIGNIFICANT CHANGE UP (ref 11.5–15.5)
IMM GRANULOCYTES NFR BLD AUTO: 0.4 % — SIGNIFICANT CHANGE UP (ref 0–0.9)
INR BLD: 1.15 RATIO — SIGNIFICANT CHANGE UP (ref 0.85–1.18)
KETONES UR-MCNC: 80 MG/DL
LACTATE SERPL-SCNC: 0.9 MMOL/L — SIGNIFICANT CHANGE UP (ref 0.7–2)
LEUKOCYTE ESTERASE UR-ACNC: ABNORMAL
LYMPHOCYTES # BLD AUTO: 1.14 K/UL — SIGNIFICANT CHANGE UP (ref 1–3.3)
LYMPHOCYTES # BLD AUTO: 8.1 % — LOW (ref 13–44)
MCHC RBC-ENTMCNC: 30.5 PG — SIGNIFICANT CHANGE UP (ref 27–34)
MCHC RBC-ENTMCNC: 34.2 GM/DL — SIGNIFICANT CHANGE UP (ref 32–36)
MCV RBC AUTO: 89.2 FL — SIGNIFICANT CHANGE UP (ref 80–100)
MONOCYTES # BLD AUTO: 0.8 K/UL — SIGNIFICANT CHANGE UP (ref 0–0.9)
MONOCYTES NFR BLD AUTO: 5.7 % — SIGNIFICANT CHANGE UP (ref 2–14)
NEUTROPHILS # BLD AUTO: 12.04 K/UL — HIGH (ref 1.8–7.4)
NEUTROPHILS NFR BLD AUTO: 85.6 % — HIGH (ref 43–77)
NITRITE UR-MCNC: POSITIVE
NRBC # BLD: 0 /100 WBCS — SIGNIFICANT CHANGE UP (ref 0–0)
PH UR: 7 — SIGNIFICANT CHANGE UP (ref 5–8)
PLATELET # BLD AUTO: 288 K/UL — SIGNIFICANT CHANGE UP (ref 150–400)
POTASSIUM SERPL-MCNC: 3.9 MMOL/L — SIGNIFICANT CHANGE UP (ref 3.5–5.3)
POTASSIUM SERPL-SCNC: 3.9 MMOL/L — SIGNIFICANT CHANGE UP (ref 3.5–5.3)
PROT SERPL-MCNC: 7.6 G/DL — SIGNIFICANT CHANGE UP (ref 6–8.3)
PROT UR-MCNC: 30 MG/DL
PROTHROM AB SERPL-ACNC: 13.1 SEC — HIGH (ref 9.5–13)
RBC # BLD: 4.36 M/UL — SIGNIFICANT CHANGE UP (ref 3.8–5.2)
RBC # FLD: 12.4 % — SIGNIFICANT CHANGE UP (ref 10.3–14.5)
RBC CASTS # UR COMP ASSIST: 1 /HPF — SIGNIFICANT CHANGE UP (ref 0–4)
SODIUM SERPL-SCNC: 138 MMOL/L — SIGNIFICANT CHANGE UP (ref 135–145)
SP GR SPEC: 1.03 — HIGH (ref 1–1.03)
UROBILINOGEN FLD QL: 1 MG/DL — SIGNIFICANT CHANGE UP (ref 0.2–1)
WBC # BLD: 14.06 K/UL — HIGH (ref 3.8–10.5)
WBC # FLD AUTO: 14.06 K/UL — HIGH (ref 3.8–10.5)
WBC UR QL: 5 /HPF — SIGNIFICANT CHANGE UP (ref 0–5)

## 2024-04-26 PROCEDURE — 74177 CT ABD & PELVIS W/CONTRAST: CPT | Mod: 26,MC

## 2024-04-26 PROCEDURE — 99291 CRITICAL CARE FIRST HOUR: CPT

## 2024-04-26 PROCEDURE — 93010 ELECTROCARDIOGRAM REPORT: CPT

## 2024-04-26 PROCEDURE — 99222 1ST HOSP IP/OBS MODERATE 55: CPT

## 2024-04-26 PROCEDURE — 99223 1ST HOSP IP/OBS HIGH 75: CPT

## 2024-04-26 RX ORDER — ENOXAPARIN SODIUM 100 MG/ML
40 INJECTION SUBCUTANEOUS EVERY 24 HOURS
Refills: 0 | Status: DISCONTINUED | OUTPATIENT
Start: 2024-04-26 | End: 2024-04-27

## 2024-04-26 RX ORDER — FAMOTIDINE 10 MG/ML
20 INJECTION INTRAVENOUS ONCE
Refills: 0 | Status: COMPLETED | OUTPATIENT
Start: 2024-04-26 | End: 2024-04-26

## 2024-04-26 RX ORDER — SODIUM CHLORIDE 9 MG/ML
1800 INJECTION INTRAMUSCULAR; INTRAVENOUS; SUBCUTANEOUS ONCE
Refills: 0 | Status: COMPLETED | OUTPATIENT
Start: 2024-04-26 | End: 2024-04-26

## 2024-04-26 RX ORDER — PIPERACILLIN AND TAZOBACTAM 4; .5 G/20ML; G/20ML
3.38 INJECTION, POWDER, LYOPHILIZED, FOR SOLUTION INTRAVENOUS ONCE
Refills: 0 | Status: COMPLETED | OUTPATIENT
Start: 2024-04-26 | End: 2024-04-26

## 2024-04-26 RX ORDER — IBUPROFEN 200 MG
600 TABLET ORAL ONCE
Refills: 0 | Status: COMPLETED | OUTPATIENT
Start: 2024-04-26 | End: 2024-04-26

## 2024-04-26 RX ORDER — ACETAMINOPHEN 500 MG
1000 TABLET ORAL ONCE
Refills: 0 | Status: COMPLETED | OUTPATIENT
Start: 2024-04-26 | End: 2024-04-26

## 2024-04-26 RX ORDER — INFLUENZA VIRUS VACCINE 15; 15; 15; 15 UG/.5ML; UG/.5ML; UG/.5ML; UG/.5ML
0.5 SUSPENSION INTRAMUSCULAR ONCE
Refills: 0 | Status: DISCONTINUED | OUTPATIENT
Start: 2024-04-26 | End: 2024-04-30

## 2024-04-26 RX ORDER — LANOLIN ALCOHOL/MO/W.PET/CERES
3 CREAM (GRAM) TOPICAL AT BEDTIME
Refills: 0 | Status: DISCONTINUED | OUTPATIENT
Start: 2024-04-26 | End: 2024-04-30

## 2024-04-26 RX ORDER — ACETAMINOPHEN 500 MG
650 TABLET ORAL EVERY 6 HOURS
Refills: 0 | Status: DISCONTINUED | OUTPATIENT
Start: 2024-04-26 | End: 2024-04-30

## 2024-04-26 RX ORDER — CEFTRIAXONE 500 MG/1
1000 INJECTION, POWDER, FOR SOLUTION INTRAMUSCULAR; INTRAVENOUS ONCE
Refills: 0 | Status: COMPLETED | OUTPATIENT
Start: 2024-04-26 | End: 2024-04-26

## 2024-04-26 RX ORDER — ONDANSETRON 8 MG/1
4 TABLET, FILM COATED ORAL EVERY 8 HOURS
Refills: 0 | Status: DISCONTINUED | OUTPATIENT
Start: 2024-04-26 | End: 2024-04-30

## 2024-04-26 RX ORDER — PIPERACILLIN AND TAZOBACTAM 4; .5 G/20ML; G/20ML
3.38 INJECTION, POWDER, LYOPHILIZED, FOR SOLUTION INTRAVENOUS ONCE
Refills: 0 | Status: COMPLETED | OUTPATIENT
Start: 2024-04-27 | End: 2024-04-27

## 2024-04-26 RX ORDER — CEFTRIAXONE 500 MG/1
1000 INJECTION, POWDER, FOR SOLUTION INTRAMUSCULAR; INTRAVENOUS ONCE
Refills: 0 | Status: DISCONTINUED | OUTPATIENT
Start: 2024-04-26 | End: 2024-04-26

## 2024-04-26 RX ORDER — PIPERACILLIN AND TAZOBACTAM 4; .5 G/20ML; G/20ML
3.38 INJECTION, POWDER, LYOPHILIZED, FOR SOLUTION INTRAVENOUS EVERY 8 HOURS
Refills: 0 | Status: DISCONTINUED | OUTPATIENT
Start: 2024-04-27 | End: 2024-04-30

## 2024-04-26 RX ADMIN — Medication 1000 MILLIGRAM(S): at 16:26

## 2024-04-26 RX ADMIN — Medication 400 MILLIGRAM(S): at 15:56

## 2024-04-26 RX ADMIN — Medication 650 MILLIGRAM(S): at 20:03

## 2024-04-26 RX ADMIN — Medication 600 MILLIGRAM(S): at 23:40

## 2024-04-26 RX ADMIN — CEFTRIAXONE 100 MILLIGRAM(S): 500 INJECTION, POWDER, FOR SOLUTION INTRAMUSCULAR; INTRAVENOUS at 17:01

## 2024-04-26 RX ADMIN — FAMOTIDINE 100 MILLIGRAM(S): 10 INJECTION INTRAVENOUS at 15:56

## 2024-04-26 RX ADMIN — Medication 1000 MILLIGRAM(S): at 16:12

## 2024-04-26 RX ADMIN — PIPERACILLIN AND TAZOBACTAM 200 GRAM(S): 4; .5 INJECTION, POWDER, LYOPHILIZED, FOR SOLUTION INTRAVENOUS at 19:01

## 2024-04-26 RX ADMIN — FAMOTIDINE 20 MILLIGRAM(S): 10 INJECTION INTRAVENOUS at 16:24

## 2024-04-26 RX ADMIN — SODIUM CHLORIDE 1800 MILLILITER(S): 9 INJECTION INTRAMUSCULAR; INTRAVENOUS; SUBCUTANEOUS at 15:11

## 2024-04-26 RX ADMIN — Medication 650 MILLIGRAM(S): at 21:00

## 2024-04-26 NOTE — PATIENT PROFILE ADULT - CONTRAINDICATIONS & PRECAUTIONS (SELECT ALL THAT APPLY)
Bactrim Pregnancy And Lactation Text: This medication is Pregnancy Category D and is known to cause fetal risk.  It is also excreted in breast milk. none...

## 2024-04-26 NOTE — H&P ADULT - HISTORY OF PRESENT ILLNESS
Patient is a 33yo F with no significant PMH complaining of R flank and abdominal pain x 2 days.  ID [id number] used to translate conversation. Patient states on Wednesday night she started feeling R abdominal pain and back pain on the right side. Then yesterday, she started having nausea, fever, chills, and headache accompanied with R flank and abdominal pain. Patient states pain is rated 6/10 in severity and localized to right flank/abdomen. Patient had a fever of 103 at home, improved with Tylenol. Patient denies sob, chest pain, vomiting, dizziness, urinary incontinence. Patient is a 35yo F with no significant PMH complaining of R flank and abdominal pain x 2 days.  ID [id number] used to translate conversation. Patient states on Wednesday night she started feeling R abdominal pain and back pain on the right side. Then yesterday, she started having nausea, fever, chills, and headache accompanied with R flank and abdominal pain. Patient states pain is rated 6/10 in severity and localized to right flank/abdomen. Patient had a fever of 103 at home, improved with Tylenol. Patient denies sob, chest pain, vomiting, dizziness, urinary incontinence, hematuria.

## 2024-04-26 NOTE — H&P ADULT - NSHPREVIEWOFSYSTEMS_GEN_ALL_CORE
REVIEW OF SYSTEMS:  CONSTITUTIONAL: + fever, no weight loss, or fatigue  EYES: No eye pain, visual disturbances, or discharge  ENMT:  No difficulty hearing, tinnitus, vertigo; No sinus or throat pain  NECK: No neck pain or neck stiffness  BREASTS: No pain, masses, or nipple discharge  RESPIRATORY: No cough, wheezing, chills or hemoptysis; No shortness of breath  CARDIOVASCULAR: No chest pain, palpitations, dizziness, or leg swelling  GASTROINTESTINAL: + abdominal pain, + nausea, no vomiting, or hematemesis; No diarrhea or constipation  GENITOURINARY: No dysuria, frequency, hematuria, or incontinence  NEUROLOGICAL: No headaches, memory loss, loss of strength, numbness, or tremors  SKIN: No itching, burning, rashes, or lesions   LYMPH NODES: No enlarged glands  ENDOCRINE: No heat or cold intolerance; No hair loss  MUSCULOSKELETAL: No joint pain or swelling; No muscle, + back pain, no extremity pain  PSYCHIATRIC: No depression, anxiety, mood swings, or difficulty sleeping  HEME/LYMPH: No easy bruising or bleeding  ALLERGY AND IMMUNOLOGIC: No hives or eczema    All other ROS reviewed and negative except as otherwise stated

## 2024-04-26 NOTE — ED PROVIDER NOTE - OBJECTIVE STATEMENT
34-year-old female presents to the emergency department complaining of right flank pain and right-sided abdominal pain.  Patient reports fevers at home intermittently for 2 days.  Tmax 103 today at 10:00 in the morning.  Patient took Tylenol this morning at 1030.  No vomiting however patient reports nausea.  No diarrhea.

## 2024-04-26 NOTE — H&P ADULT - NS ATTEND AMEND GEN_ALL_CORE FT
Sepsis 2/2 pyelonephritis   Urology eval noted. continue zosyn. follow cultures. trend wbc and fever curve  repeat US monday or if clinically worsening to assess abscess

## 2024-04-26 NOTE — ED PROVIDER NOTE - CLINICAL SUMMARY MEDICAL DECISION MAKING FREE TEXT BOX
34-year-old female presents to the emergency department complaining of right flank pain and right-sided abdominal pain.  Patient reports fevers at home intermittently for 2 days.  Tmax 103 today at 10:00 in the morning.  Patient took Tylenol this morning at 1030.  No vomiting however patient reports nausea.  No diarrhea.  Exam as stated.  Patient meeting criteria for sepsis.  Urine with positive UTI.  CT demonstrates signs of pyelonephritis however 2.2 cm hypoechoic area which is concerning for abscess.  Discussed with Dr. Gomez.  Recommend IV antibiotics.  Abscess appears small.  No transfer indicated at this time.  Will admit.  Discussed with hospitalist for admission

## 2024-04-26 NOTE — H&P ADULT - NSICDXFAMILYHX_GEN_ALL_CORE_FT
FAMILY HISTORY:  Mother  Still living? Yes, Estimated age: Age Unknown  FH: HTN (hypertension), Age at diagnosis: Age Unknown  FHx: heart disease, Age at diagnosis: Age Unknown

## 2024-04-26 NOTE — PATIENT PROFILE ADULT - FUNCTIONAL ASSESSMENT - BASIC MOBILITY ASSESSMENT TYPE
Chief Complaint   Patient presents with    Follow-Up     last seen 7/20/2021        HPI:  Tanvi Krueger is a 57 y.o. year old female here today for follow-up on complex sleep apnea.   Last OV 7/20/21 with Dr. Weber (sent to ER/ for chest pain)    She is currently using ASV 9/4/16cm with 3 L oxygen bleed in; Resp[employer]ics DreamStation BiPAP device obtained 2022.    Overnight oximetry 7/12/2021 indicated O2 zuhair of 83%, 22 minutes of low oxygen levels less than 88% and less than 90% for 34% of study.  Recommendation was increasing EPAP to 10 cm and increasing O2 to 3 L/min.  Reviewed with patient.    Compliance report notes pressure settings EPAP 9, pressure support 0/4 with max pressure 25.  Patient's use 8/16/2022 through 9/14/2022 indicates 96.7% compliance, average nightly use 7 hours 20 minutes, average EPAP 9 cm, minimal mask leak with an overall AHI of 2.5/h.  Reviewed with patient.  We will make slight adjustment in her settings to what she was previously using she will continue 3 L/min O2.  We will update overnight oximetry on this setting to verify things are adequate.  She overall feels she benefits from therapy has no significant complaints.  Since her sleep study January 2019 she has lost 20 pounds.    She has a history of significant secondhand smoke but a non-smoker herself.  Prior PFTs 8/20/2017 noted mild restriction secondary to BMI with normal total lung capacity and DLCO.  FEV1 2.27 L or 74%.  CT chest 2/10/2018 noted resolution of prior lung nodules noted in 2017.  Unremarkable CT scan of chest.  Chest x-ray 9/4/2021 noted no acute cardiopulmonary process.  Echo 9/16/2017 indicated LVEF 60%, ascending aorta 3.1 cm, and RVSP not calculated.    Today she notes having ongoing concerns about her family history of lung cancer and her significant secondhand smoke exposure as a child.  She denies significant shortness of breath but could occur with increased exertion and will sometimes have some  chest tightness.  She notes having difficulty breathing through the right side of her nose/throat impending follow-up with ENT to review this.  She has noted low oxygen levels during the day at 88% but not on a regular basis.  She continues use a travel O2 concentrator that she owns her she will rent 1 from the [employer] for travel but the device is quite large and cumbersome.      Sleep hx:  PSG from split night study from 10/2/16 indicated Severe obstructive sleep apnea hypopnea was identified. The AHI was 81.4, the minimum saturation 79%, and saturations were less than 90% for 72.6% of the recording. Apneas comprised 8.5% of the total events. The patient had 200 respiratory events during the diagnostic analysis. The patient underwent a Pap titration. During treatment with Pap, central apneas comprised 64.4% of the total number of events. Neither CPAP nor bilevel were effective in normalizing the respiratory events secondary to treatment emergent central apneas.     PSG titration from 10/17/16 indicated a significant but incomplete response to servo adaptive BiPAP ventilation in a patient with previously demonstrated central sleep apnea or complex sleep apnea. Specific treatment is difficult to determine from this limited study.      PSG split night study from 6/27/17 indicated the patient had 3 apneas in total.  Of these, 0 were obstructive apneas, and 3 were central apneas.  This resulted in an apnea index (AI) of 0.9.  The patient had 69 hypopneas in total, which resulted in a hypopnea index of 21.0.  The overall AHI was 21.9, while the AHI during REM was 74.4.  The supine AHI = N/A. CPAP was initiated 6 cm and increased to a maximum of 9 cm. The patient did best on CPAP at 9 cm where supine REM sleep was achieved, the apnea hypopnea index was 0.0, the minimum saturation 85%, and the mean saturation 91.1%. Recommended was CPAP at 9 cmH2O.      OPO on ASV 9 cmH2O, PS 4/16 cmH2O from 8/11/17 indicated the basal  "arterial oxygen saturation is 91%. Saturation is reasonably well-maintained at 88% or above through most of the night with occasional drops to about 86%. Overall she spends 22% of the study time with a saturation below 90% but only four minutes with a saturation less than 88%. The average heart rate is 66 bpm.     PSG split night study from 1/9/19 indicated severe obstructive sleep apnea with AHI of 56.1/hr and O2 zuhair 82 %. Due to severity of the disease she met the split study protocol. The titration started with CPAP 6 cm and the best tolerated was BiPAP 15/11 cm. The AHI improved to 2.89/hr with improved O2 zuhair of 88% and average O2 saturation of 91 %.  Sleep-related hypoxia.     ROS: As per HPI and otherwise negative if not stated.    Past Medical History:   Diagnosis Date    Anemia     Anesthesia     nausea/vomiting    Anxiety     Arthritis     Asthma     allergy related    Autoimmune cerebritis (HCC) 1/9/2018    Bowel habit changes     constipation    Cholesteatoma of middle ear and mastoid(385.33) 1992 surgery    mastoidectomy, prosthesis    Depression     Dyslipidemia, goal LDL below 130     Elevated glycohemoglobin     Eosinophilic esophagitis     Family history of early CAD     Hiatus hernia syndrome     \"was told I had one\"    History of chickenpox     History of endometriosis     History of partial thyroidectomy     partial thyroidectomy    Hypothyroidism due to Hashimoto's thyroiditis     Lumbar disc narrowing 7/14/2009    Menopausal symptoms     Oxygen desaturation during sleep     O2 2 liters HS    Pneumonia     hx    Recurrent sinus infections     Seizure (HCC)     Seizure cerebral (HCC) 6/20/2017    Sleep apnea     ASV with 2L oxygen at night (sean)     Tonsillitis     Vitamin d deficiency     Wears hearing aid in both ears 8/2/2022       Past Surgical History:   Procedure Laterality Date    LARYNGOSCOPY N/A 3/31/2017    Procedure: LARYNGOSCOPY - DIRECT W/BIOPSY BASE OF TONGUE AND " NASOPHARYNGOSCOPY W/BIOPSY;  Surgeon: Naresh Abdi M.D.;  Location: SURGERY SAME DAY [facility];  Service:     THYROIDECTOMY TOTAL Left 10/14/2016    Procedure: LEFT PARTIAL THROIDECTOMY;  Surgeon: Naresh Abdi M.D.;  Location: SURGERY SAME DAY [facility];  Service:     SEPTOPLASTY  9/9/2016    Procedure: SEPTOPLASTY;  Surgeon: Naresh Abdi M.D.;  Location: SURGERY SAME DAY [facility];  Service:     TURBINOPLASTY Bilateral 9/9/2016    Procedure: TURBINOPLASTY;  Surgeon: Naresh Abdi M.D.;  Location: SURGERY SAME DAY [facility];  Service:     OTHER  2016    sinus surgery    EAR MIDDLE EXPLORATION  4/3/2015    Performed by Naresh Abdi M.D. at SURGERY SAME DAY [facility]    OSSICULAR RECONSTRUCTION  4/3/2015    Performed by Naresh Abdi M.D. at SURGERY SAME DAY [facility] ORS    MYRINGOTOMY  4/20/2012    Performed by RYANNE ALICIA at SURGERY [facility]    TYMPANOTOMY  6/24/2010    Performed by MAXIMUS WHITE at SURGERY SAME DAY [facility] ORS    EXAM UNDER ANESTHESIA  6/24/2010    Performed by MAXIMUS WHITE at SURGERY SAME DAY [facility] ORS    MYRINGOTOMY  4/30/2009    Performed by MAXIMUS WHITE at SURGERY SAME DAY [facility] ORS    ABDOMINAL HYSTERECTOMY TOTAL  1997    ovaries are gone    ARTHROSCOPY, KNEE      HYSTERECTOMY LAPAROSCOPY      SINUSCOPE      TONSILLECTOMY         Family History   Problem Relation Age of Onset    Cancer Mother 61        lung, smoker    Heart Disease Mother 44        early heart attack    Sleep Apnea Mother         possibly    Heart Disease Father 60        cabg x 3    Hyperlipidemia Father     Psychiatric Illness Father         schizophrenia    Kidney Disease Father         renal failure, dialysis    Dementia Father     Hyperlipidemia Brother     Suicide Attempts Brother         completed suicide 10/14/2021    Cancer Maternal Grandmother 62        lung, non-smoker    Psychiatric Illness Maternal Grandmother         depression, severe    Psychiatric  "Illness Other         depression, great uncle    Anxiety disorder Sister        Social History     Socioeconomic History    Marital status:      Spouse name: Not on file    Number of children: Not on file    Years of education: Not on file    Highest education level: Not on file   Occupational History    Not on file   Tobacco Use    Smoking status: Never    Smokeless tobacco: Never    Tobacco comments:     Patient grew up with 3 smokers in home    Vaping Use    Vaping Use: Never used   Substance and Sexual Activity    Alcohol use: No     Alcohol/week: 0.0 oz    Drug use: No    Sexual activity: Yes     Partners: Male   Other Topics Concern    Not on file   Social History Narrative    Not on file     Social Determinants of Health     Financial Resource Strain: Not on file   Food Insecurity: Not on file   Transportation Needs: Not on file   Physical Activity: Not on file   Stress: Not on file   Social Connections: Not on file   Intimate Partner Violence: Not on file   Housing Stability: Not on file       Allergies as of 09/15/2022 - Reviewed 09/15/2022   Allergen Reaction Noted    Doxycycline Nausea and Unspecified 01/29/2014    Penicillin g Unspecified 04/30/2021    Cephalexin Diarrhea and Unspecified 09/21/2016    Pcn [penicillins] Unspecified 07/09/2009        Vitals:  /66 (BP Location: Left arm, Patient Position: Sitting, BP Cuff Size: Adult)   Pulse 95   Resp 16   Ht 1.74 m (5' 8.5\")   Wt 112 kg (247 lb)   SpO2 95%     Current medications as of today   Current Outpatient Medications   Medication Sig Dispense Refill    Vitamins A & D (VITAMIN A & D) 5000-400 units Cap 1      LEVOXYL 125 MCG Tab Take 1 Tablet by mouth every morning on an empty stomach. 30 Tablet 6    sertraline (ZOLOFT) 100 MG Tab Take 2 Tablets by mouth every day. 180 Tablet 2    albuterol 108 (90 Base) MCG/ACT Aero Soln inhalation aerosol Inhale 2 Puffs every 6 hours as needed for Shortness of Breath. 8.5 g 6    Misc Natural " Products (METABO-STYLE PO)       Cholecalciferol (VITAMIN D3) 125 MCG (5000 UT) Cap Take 1 Capsule by mouth every day. 30 Capsule 11    Multiple Vitamin (MULTIVITAMIN ADULT PO) Take 1 Tablet by mouth every day.       No current facility-administered medications for this visit.         Physical Exam:   Gen:           Alert and oriented, No apparent distress. Mood and affect appropriate, normal interaction with examiner.  Eyes:          PERRL, EOM intact, sclere white, conjunctive moist.  Ears:          Not examined.   Hearing:     Grossly intact.  Nose:          Normal, no lesions or deformities.  Dentition:    Mask.  Oropharynx:   Mask.  Mallampati Classification: mask  Neck:        Supple, trachea midline, no masses.  Respiratory Effort: No intercostal retractions or use of accessory muscles.   Lung Auscultation:      Clear to auscultation bilaterally; no rales, rhonchi or wheezing.  CV:            Regular rate and rhythm. No murmurs, rubs or gallops.  Abd:           Not examined.   Lymphadenopathy: Not examined.  Gait and Station: Normal.  Digits and Nails: No clubbing, cyanosis, petechiae, or nodes.   Cranial Nerves: II-XII grossly intact.  Skin:        No rashes, lesions or ulcers noted.               Ext:           No cyanosis or edema.      Assessment:  1. Central sleep apnea  Overnight Oximetry    EC-ECHOCARDIOGRAM COMPLETE W/O CONT    DME Mask and Supplies    DME Other    DME Other    CANCELED: DME Other      2. Nocturnal hypoxia  Overnight Oximetry    EC-ECHOCARDIOGRAM COMPLETE W/O CONT      3. Family history of lung cancer  PULMONARY FUNCTION TESTS -Test requested: Complete Pulmonary Function Test; Include MIPS/MEPS? No      4. Secondhand smoke exposure  PULMONARY FUNCTION TESTS -Test requested: Complete Pulmonary Function Test; Include MIPS/MEPS? No    DX-CHEST-2 VIEWS      5. Need for vaccination  INFLUENZA VACCINE QUAD INJ (PF)    Pneumococcal Conjugate Vaccine 20-Valent (19 yrs+)      6. Shortness of  breath  PULMONARY FUNCTION TESTS -Test requested: Complete Pulmonary Function Test; Include MIPS/MEPS? No    DX-CHEST-2 VIEWS    EC-ECHOCARDIOGRAM COMPLETE W/O CONT      7. BMI 37.0-37.9, adult  EC-ECHOCARDIOGRAM COMPLETE W/O CONT    HEIGHT AND WEIGHT      8. Nonsmoker            Immunizations:    Flu:given today  Pneumovax 23:2017  Prevnar 13:not due  PCV 20: given today  COVID-19: 11/19/21, 4/22/21, 3/30/21    Plan:  Patient was seen for 50 minutes, more than 50% of time spent in face to face review, counseling, and arranging future evaluation and follow up of medical conditions and care related to  current sleep apnea therapy and reevaluation of nocturnal hypoxia, reviewed all prior imaging, PFTs and compliance reports.  Discussed family history of lung cancer and her significant secondhand smoke exposure.  Patient is clinically stable and will proceed with following plan. Answered all patient questions to their satisfaction.    Patient central sleep apnea is controlled on ASV therapy with bleed in O2.  We will make adjustments to her current pressure as I ordered.  DME other; adjust ASV to EPAP 10, PS 4/15 cm with 3 L oxygen bleed in  DME mask/supplies  DME other; wireless access for now and sleep  Continue nocturnal oxygen at 3 L/min.  Patient will continue to benefit from therapy.  Overnight oximetry on current ASV with 3 L oxygen bleed in to reevaluate for nocturnal hypoxia; pending results and make further adjustments in therapy.  Patient notes respiratory concerns and history of lung cancer in the family.  She does have a history of lung nodules that have resolved.  Prior chest x-ray last year was clear.  We will update chest x-ray for surveillance.  Due to some chest tightness and low oxygen saturations noted during the day we will update echo to reevaluate EF and RVSP.  We will also update PFT to reevaluate lung function to see if this is improved due to her weight loss.  Echo  PFT  Chest x-ray  Influenza  and Prevnar 20 vaccinations given in clinic today  Encourage further weight loss through healthy eating and regular activity  Follow-up primary care further health concerns  Follow-up in 3 months to review all testing including PFT, chest x-ray, echo and CNOX results for long appointment, sooner if needed.    Please note that this dictation was created using voice recognition software. I have made every reasonable attempt to correct obvious errors, but it is possible there are errors of grammar and possibly content that I did not discover before finalizing the note.       Admission

## 2024-04-26 NOTE — H&P ADULT - NSHPPHYSICALEXAM_GEN_ALL_CORE
Vital Signs Last 24 Hrs  T(F): 98.2 (26 Apr 2024 14:14), Max: 98.2 (26 Apr 2024 14:14)  HR: 109 (26 Apr 2024 17:15) (107 - 113)  BP: 105/71 (26 Apr 2024 17:15) (105/71 - 119/78)  RR: 25 (26 Apr 2024 17:15) (18 - 25)  SpO2: 99% (26 Apr 2024 17:15) (99% - 100%)    PHYSICAL EXAM:  GENERAL: NAD, well-groomed, well-developed  HEAD:  Atraumatic, Normocephalic  EYES: EOMI, conjunctiva and sclera clear  ENMT: Moist mucous membranes, Good dentition, no thrush  NECK: Supple, No JVD  CHEST/LUNG: Clear to auscultation bilaterally, good air entry, non-labored breathing  HEART: Tachycardic; S1/S2, No murmur  ABDOMEN: Soft, R sided tenderness, Nondistended; Bowel sounds present, +CVA tenderness  EXTREMITIES: No calf tenderness, No cyanosis, No edema  SKIN: Warm, Intact  PSYCH: Normal mood, Normal affect  NERVOUS SYSTEM:  A/O x3, Good concentration; CN 2-12 intact, No focal deficits

## 2024-04-26 NOTE — ED PROVIDER NOTE - CARE TRANSITIONED TO INPATIENT TEAM AT:
26-Apr-2024 17:29 Complex Repair And Single Advancement Flap Text: The defect edges were debeveled with a #15 scalpel blade.  The primary defect was closed partially with a complex linear closure.  Given the location of the remaining defect, shape of the defect and the proximity to free margins a single advancement flap was deemed most appropriate for complete closure of the defect.  Using a sterile surgical marker, an appropriate advancement flap was drawn incorporating the defect and placing the expected incisions within the relaxed skin tension lines where possible.    The area thus outlined was incised deep to adipose tissue with a #15 scalpel blade.  The skin margins were undermined to an appropriate distance in all directions utilizing iris scissors.

## 2024-04-26 NOTE — ED ADULT NURSE NOTE - NSFALLUNIVINTERV_ED_ALL_ED
Bed/Stretcher in lowest position, wheels locked, appropriate side rails in place/Call bell, personal items and telephone in reach/Instruct patient to call for assistance before getting out of bed/chair/stretcher/Non-slip footwear applied when patient is off stretcher/[place] to call system/Physically safe environment - no spills, clutter or unnecessary equipment/Purposeful proactive rounding/Room/bathroom lighting operational, light cord in reach

## 2024-04-26 NOTE — ED ADULT NURSE NOTE - OBJECTIVE STATEMENT
Pt a&ox4 ambulatory to ED c/o RUQ abdominal pain x 2 days. She is now having fevers and chills and headaches.

## 2024-04-26 NOTE — H&P ADULT - ASSESSMENT
Patient is a 34yoF with no significant PMH complaining of R sided abdominal and flank pain for 2 days. Admitted with sepsis secondary to pyelonephritis with abscess    #Sepsis secondary to pyelonephritis with abscess (tacchycardia, leukocytosis)  -CTAP: 2.2 cm region of hypoenhancement within the right kidney, question pyelonephritis with underlying abscess.  -Urology consulted/following-no need for abscess drainage at this time, will elect to treat with antibiotics and monitor clinical course  -Received 1 1800mL fluid bolus NS in ED  -Lactate negative, f/u repeat  -Patient received 1 dose 1 gram CTX in ED  -Will start IV zosyn per urology recs  -F/U blood cultures x 2  -F/U urine culture  -Monitor Fever curve and WBC  -AM labs    #DVT prophylaxis  -Lovenox     Patient declines I update family as she is updating them herself

## 2024-04-26 NOTE — ED PROVIDER NOTE - PHYSICAL EXAMINATION
General:  in pain.  Eyes: PERRL, white sclera  Head:     NC/AT, EOMI  Pharynx: pharynx wnl, oral mucosa moist  Neck:     trachea midline  Lungs:     CTA b/l  CVS:     tachycardia  Abd:     +BS, right CVA tenderness; right upper and lower abd tenderness. No rebound or guarding.   Ext:   no deformities   Skin: no rash  Neuro: AAOx3, no sensory/motor deficits

## 2024-04-26 NOTE — CONSULT NOTE ADULT - SUBJECTIVE AND OBJECTIVE BOX
HPI:  Patient is a 35 yo Female Without significant past medical history arrives with 2 days of right upper quadrant radiating to right flank pain associated with fevers Tmax 103.  She states prior to the pain beginning she had felt well with no other symptoms.  Upon questioning she does endorse that when she urinates it feels hot but there is no burning per se.  There are no changes to her urinary frequency color or smell.  2 years ago she did have a UTI which was treated with oral antibiotics.  She has no other history of infections.  No history of stone disease  Here in the ER, is normotensive, but tachycardic.  Has been given ceftriaxone and Tylenol.  Blood and urine culture sent and received  HIE reviewed    PAST MEDICAL & SURGICAL HISTORY:  No pertinent past medical history        FAMILY HISTORY:   No known  malignancy   SOCIAL HISTORY: allergic to alcohol, no tobacco    MEDICATIONS  (STANDING):  none     MEDICATIONS  (PRN):  acetaminophen     Tablet .. 650 milliGRAM(s) Oral every 6 hours PRN Temp greater or equal to 38C (100.4F), Mild Pain (1 - 3)  aluminum hydroxide/magnesium hydroxide/simethicone Suspension 30 milliLiter(s) Oral every 4 hours PRN Dyspepsia  melatonin 3 milliGRAM(s) Oral at bedtime PRN Insomnia  ondansetron Injectable 4 milliGRAM(s) IV Push every 8 hours PRN Nausea and/or Vomiting    Allergies    No Known Allergies    Intolerances    REVIEW OF SYSTEMS: Pertinent positives and negatives as stated in HPI, otherwise negative    Vital signs  T(C): 36.8 (24 @ 14:14), Max: 36.8 (24 @ 14:14)  HR: 109 (24 @ 17:15)  BP: 105/71 (24 @ 17:15)  SpO2: 99% (24 @ 17:15)  Wt(kg): --    Physical Exam    Gen: awake alert NAD diaphoretic nontoxic appearing     HEENT: normocephalic, atraumatic, no scleral icterus    Pulm: No respiratory distress, no subcostal retractions, no accessory muscle use     CV: S1 S2,    Abd: flat Soft, NT, ND,    : nonpalp bladder no suprapubic tenderness     MSK:  + right cvat no left   Moving all extremities, full ROM in all extremities, No edema present    NEURO: A&Ox3, no focal neurological deficits, CN 2-12 grossly intact    SKIN: diaphoretic     LABS:                          13.3   14.06 )-----------( 288      ( 2024 15:00 )             38.9           138  |  103  |  9   ----------------------------<  90  3.9   |  26  |  0.72    Ca    8.9      2024 15:00    TPro  7.6  /  Alb  3.7  /  TBili  0.7  /  DBili  x   /  AST  34  /  ALT  43  /  AlkPhos  73      PT/INR - ( 2024 15:00 )   PT: 13.1 sec;   INR: 1.15 ratio         PTT - ( 2024 15:00 )  PTT:30.3 sec  Urinalysis Basic - ( 2024 15:55 )    Color: Yellow / Appearance: Cloudy / S.031 / pH: x  Gluc: x / Ketone: 80 mg/dL  / Bili: Negative / Urobili: 1.0 mg/dL   Blood: x / Protein: 30 mg/dL / Nitrite: Positive   Leuk Esterase: Small / RBC: 1 /HPF / WBC 5 /HPF   Sq Epi: x / Non Sq Epi: x / Bacteria: Moderate /HPF        Urine Cx: sent and rec   Blood Cx: sent and rec     Radiology:  < from: CT Abdomen and Pelvis w/ IV Cont (24 @ 16:57) >  FINDINGS:  LOWER CHEST: Clear lung bases.    LIVER: Within normal limits.  BILE DUCTS: Normal caliber.  GALLBLADDER: Within normal limits.  SPLEEN: Within normal limits.  PANCREAS: Within normal limits.  ADRENALS: Within normal limits.  KIDNEYS/URETERS: Right renal cortical scarring. 2.2 cm region of   hypoenhancement in the right kidney. Adjacent cyst or diverticulum in the   right kidney. No hydronephrosis.    BLADDER: Minimally distended.  REPRODUCTIVE ORGANS: Uterus and adnexa within normal limits.    BOWEL: No bowel obstruction. Appendix is within normal limits.  PERITONEUM: No ascites.  VESSELS: Within normal limits.  RETROPERITONEUM/LYMPH NODES: No lymphadenopathy.  ABDOMINAL WALL: Within normal limits.  BONES: Within normal limits.    IMPRESSION:  2.2 cm region of hypoenhancement within the right kidney, question   pyelonephritis with underlying abscess.      < end of copied text >

## 2024-04-26 NOTE — H&P ADULT - NSHPLABSRESULTS_GEN_ALL_CORE
< from: CT Abdomen and Pelvis w/ IV Cont (24 @ 16:57) >    IMPRESSION:  2.2 cm region of hypoenhancement within the right kidney, question   pyelonephritis with underlying abscess.    < end of copied text >                            13.3   14.06 )-----------( 288      ( 2024 15:00 )             38.9           138  |  103  |  9   ----------------------------<  90  3.9   |  26  |  0.72    Ca    8.9      2024 15:00    TPro  7.6  /  Alb  3.7  /  TBili  0.7  /  DBili  x   /  AST  34  /  ALT  43  /  AlkPhos  73                Urinalysis Basic - ( 2024 15:55 )    Color: Yellow / Appearance: Cloudy / S.031 / pH: x  Gluc: x / Ketone: 80 mg/dL  / Bili: Negative / Urobili: 1.0 mg/dL   Blood: x / Protein: 30 mg/dL / Nitrite: Positive   Leuk Esterase: Small / RBC: 1 /HPF / WBC 5 /HPF   Sq Epi: x / Non Sq Epi: x / Bacteria: Moderate /HPF        PT/INR - ( 2024 15:00 )   PT: 13.1 sec;   INR: 1.15 ratio         PTT - ( 2024 15:00 )  PTT:30.3 sec    Lactate Trend   @ 15:00 Lactate:0.9             CAPILLARY BLOOD GLUCOSE

## 2024-04-26 NOTE — ED PROVIDER NOTE - NS_EDPROVIDERDISPOUSERTYPE_ED_A_ED
INR is now therapeutic. Patient reports some allergy symptoms but no other changes. Continue dose and diet until follow-up. Patient reports no bleeding or bruising, no new medications and no diet changes.  I reminded the patient to call with any problems, changes or questions before the next visit.    
Attending Attestation (For Attendings USE Only)...

## 2024-04-27 LAB
ALBUMIN SERPL ELPH-MCNC: 3 G/DL — LOW (ref 3.3–5)
ALP SERPL-CCNC: 75 U/L — SIGNIFICANT CHANGE UP (ref 40–120)
ALT FLD-CCNC: 68 U/L — HIGH (ref 10–45)
ANION GAP SERPL CALC-SCNC: 8 MMOL/L — SIGNIFICANT CHANGE UP (ref 5–17)
AST SERPL-CCNC: 51 U/L — HIGH (ref 10–40)
BASOPHILS # BLD AUTO: 0.03 K/UL — SIGNIFICANT CHANGE UP (ref 0–0.2)
BASOPHILS NFR BLD AUTO: 0.3 % — SIGNIFICANT CHANGE UP (ref 0–2)
BILIRUB SERPL-MCNC: 0.6 MG/DL — SIGNIFICANT CHANGE UP (ref 0.2–1.2)
BUN SERPL-MCNC: 8 MG/DL — SIGNIFICANT CHANGE UP (ref 7–23)
CALCIUM SERPL-MCNC: 8.6 MG/DL — SIGNIFICANT CHANGE UP (ref 8.4–10.5)
CHLORIDE SERPL-SCNC: 106 MMOL/L — SIGNIFICANT CHANGE UP (ref 96–108)
CO2 SERPL-SCNC: 25 MMOL/L — SIGNIFICANT CHANGE UP (ref 22–31)
CREAT SERPL-MCNC: 0.8 MG/DL — SIGNIFICANT CHANGE UP (ref 0.5–1.3)
EGFR: 97 ML/MIN/1.73M2 — SIGNIFICANT CHANGE UP
EOSINOPHIL # BLD AUTO: 0.02 K/UL — SIGNIFICANT CHANGE UP (ref 0–0.5)
EOSINOPHIL NFR BLD AUTO: 0.2 % — SIGNIFICANT CHANGE UP (ref 0–6)
GLUCOSE SERPL-MCNC: 95 MG/DL — SIGNIFICANT CHANGE UP (ref 70–99)
HCT VFR BLD CALC: 35.8 % — SIGNIFICANT CHANGE UP (ref 34.5–45)
HGB BLD-MCNC: 11.8 G/DL — SIGNIFICANT CHANGE UP (ref 11.5–15.5)
IMM GRANULOCYTES NFR BLD AUTO: 0.5 % — SIGNIFICANT CHANGE UP (ref 0–0.9)
LYMPHOCYTES # BLD AUTO: 0.92 K/UL — LOW (ref 1–3.3)
LYMPHOCYTES # BLD AUTO: 9.3 % — LOW (ref 13–44)
MCHC RBC-ENTMCNC: 29.7 PG — SIGNIFICANT CHANGE UP (ref 27–34)
MCHC RBC-ENTMCNC: 33 GM/DL — SIGNIFICANT CHANGE UP (ref 32–36)
MCV RBC AUTO: 90.2 FL — SIGNIFICANT CHANGE UP (ref 80–100)
MONOCYTES # BLD AUTO: 0.75 K/UL — SIGNIFICANT CHANGE UP (ref 0–0.9)
MONOCYTES NFR BLD AUTO: 7.6 % — SIGNIFICANT CHANGE UP (ref 2–14)
NEUTROPHILS # BLD AUTO: 8.08 K/UL — HIGH (ref 1.8–7.4)
NEUTROPHILS NFR BLD AUTO: 82.1 % — HIGH (ref 43–77)
NRBC # BLD: 0 /100 WBCS — SIGNIFICANT CHANGE UP (ref 0–0)
PLATELET # BLD AUTO: 257 K/UL — SIGNIFICANT CHANGE UP (ref 150–400)
POTASSIUM SERPL-MCNC: 3.7 MMOL/L — SIGNIFICANT CHANGE UP (ref 3.5–5.3)
POTASSIUM SERPL-SCNC: 3.7 MMOL/L — SIGNIFICANT CHANGE UP (ref 3.5–5.3)
PROT SERPL-MCNC: 6.6 G/DL — SIGNIFICANT CHANGE UP (ref 6–8.3)
RBC # BLD: 3.97 M/UL — SIGNIFICANT CHANGE UP (ref 3.8–5.2)
RBC # FLD: 12.5 % — SIGNIFICANT CHANGE UP (ref 10.3–14.5)
SODIUM SERPL-SCNC: 139 MMOL/L — SIGNIFICANT CHANGE UP (ref 135–145)
WBC # BLD: 9.85 K/UL — SIGNIFICANT CHANGE UP (ref 3.8–10.5)
WBC # FLD AUTO: 9.85 K/UL — SIGNIFICANT CHANGE UP (ref 3.8–10.5)

## 2024-04-27 PROCEDURE — 99233 SBSQ HOSP IP/OBS HIGH 50: CPT

## 2024-04-27 RX ORDER — IBUPROFEN 200 MG
400 TABLET ORAL ONCE
Refills: 0 | Status: COMPLETED | OUTPATIENT
Start: 2024-04-27 | End: 2024-04-27

## 2024-04-27 RX ADMIN — PIPERACILLIN AND TAZOBACTAM 25 GRAM(S): 4; .5 INJECTION, POWDER, LYOPHILIZED, FOR SOLUTION INTRAVENOUS at 14:01

## 2024-04-27 RX ADMIN — Medication 600 MILLIGRAM(S): at 00:40

## 2024-04-27 RX ADMIN — Medication 400 MILLIGRAM(S): at 12:41

## 2024-04-27 RX ADMIN — Medication 400 MILLIGRAM(S): at 13:41

## 2024-04-27 RX ADMIN — PIPERACILLIN AND TAZOBACTAM 25 GRAM(S): 4; .5 INJECTION, POWDER, LYOPHILIZED, FOR SOLUTION INTRAVENOUS at 05:33

## 2024-04-27 RX ADMIN — PIPERACILLIN AND TAZOBACTAM 25 GRAM(S): 4; .5 INJECTION, POWDER, LYOPHILIZED, FOR SOLUTION INTRAVENOUS at 00:04

## 2024-04-27 RX ADMIN — PIPERACILLIN AND TAZOBACTAM 25 GRAM(S): 4; .5 INJECTION, POWDER, LYOPHILIZED, FOR SOLUTION INTRAVENOUS at 22:28

## 2024-04-27 RX ADMIN — Medication 400 MILLIGRAM(S): at 23:49

## 2024-04-27 RX ADMIN — ENOXAPARIN SODIUM 40 MILLIGRAM(S): 100 INJECTION SUBCUTANEOUS at 05:34

## 2024-04-28 LAB
ALBUMIN SERPL ELPH-MCNC: 3.2 G/DL — LOW (ref 3.3–5)
ALP SERPL-CCNC: 91 U/L — SIGNIFICANT CHANGE UP (ref 40–120)
ALT FLD-CCNC: 77 U/L — HIGH (ref 10–45)
ANION GAP SERPL CALC-SCNC: 8 MMOL/L — SIGNIFICANT CHANGE UP (ref 5–17)
AST SERPL-CCNC: 36 U/L — SIGNIFICANT CHANGE UP (ref 10–40)
BILIRUB SERPL-MCNC: 0.3 MG/DL — SIGNIFICANT CHANGE UP (ref 0.2–1.2)
BUN SERPL-MCNC: 10 MG/DL — SIGNIFICANT CHANGE UP (ref 7–23)
CALCIUM SERPL-MCNC: 8.9 MG/DL — SIGNIFICANT CHANGE UP (ref 8.4–10.5)
CHLORIDE SERPL-SCNC: 104 MMOL/L — SIGNIFICANT CHANGE UP (ref 96–108)
CO2 SERPL-SCNC: 26 MMOL/L — SIGNIFICANT CHANGE UP (ref 22–31)
CREAT SERPL-MCNC: 0.81 MG/DL — SIGNIFICANT CHANGE UP (ref 0.5–1.3)
EGFR: 96 ML/MIN/1.73M2 — SIGNIFICANT CHANGE UP
GLUCOSE SERPL-MCNC: 94 MG/DL — SIGNIFICANT CHANGE UP (ref 70–99)
HCT VFR BLD CALC: 37 % — SIGNIFICANT CHANGE UP (ref 34.5–45)
HGB BLD-MCNC: 12.6 G/DL — SIGNIFICANT CHANGE UP (ref 11.5–15.5)
MCHC RBC-ENTMCNC: 30.4 PG — SIGNIFICANT CHANGE UP (ref 27–34)
MCHC RBC-ENTMCNC: 34.1 GM/DL — SIGNIFICANT CHANGE UP (ref 32–36)
MCV RBC AUTO: 89.4 FL — SIGNIFICANT CHANGE UP (ref 80–100)
NRBC # BLD: 0 /100 WBCS — SIGNIFICANT CHANGE UP (ref 0–0)
PLATELET # BLD AUTO: 280 K/UL — SIGNIFICANT CHANGE UP (ref 150–400)
POTASSIUM SERPL-MCNC: 4.1 MMOL/L — SIGNIFICANT CHANGE UP (ref 3.5–5.3)
POTASSIUM SERPL-SCNC: 4.1 MMOL/L — SIGNIFICANT CHANGE UP (ref 3.5–5.3)
PROT SERPL-MCNC: 7.4 G/DL — SIGNIFICANT CHANGE UP (ref 6–8.3)
RBC # BLD: 4.14 M/UL — SIGNIFICANT CHANGE UP (ref 3.8–5.2)
RBC # FLD: 12.7 % — SIGNIFICANT CHANGE UP (ref 10.3–14.5)
SODIUM SERPL-SCNC: 138 MMOL/L — SIGNIFICANT CHANGE UP (ref 135–145)
WBC # BLD: 7.02 K/UL — SIGNIFICANT CHANGE UP (ref 3.8–10.5)
WBC # FLD AUTO: 7.02 K/UL — SIGNIFICANT CHANGE UP (ref 3.8–10.5)

## 2024-04-28 PROCEDURE — 99232 SBSQ HOSP IP/OBS MODERATE 35: CPT

## 2024-04-28 RX ADMIN — PIPERACILLIN AND TAZOBACTAM 25 GRAM(S): 4; .5 INJECTION, POWDER, LYOPHILIZED, FOR SOLUTION INTRAVENOUS at 06:25

## 2024-04-28 RX ADMIN — PIPERACILLIN AND TAZOBACTAM 25 GRAM(S): 4; .5 INJECTION, POWDER, LYOPHILIZED, FOR SOLUTION INTRAVENOUS at 22:11

## 2024-04-28 RX ADMIN — Medication 400 MILLIGRAM(S): at 00:50

## 2024-04-28 RX ADMIN — PIPERACILLIN AND TAZOBACTAM 25 GRAM(S): 4; .5 INJECTION, POWDER, LYOPHILIZED, FOR SOLUTION INTRAVENOUS at 13:25

## 2024-04-29 ENCOUNTER — TRANSCRIPTION ENCOUNTER (OUTPATIENT)
Age: 34
End: 2024-04-29

## 2024-04-29 PROCEDURE — 99231 SBSQ HOSP IP/OBS SF/LOW 25: CPT

## 2024-04-29 PROCEDURE — 99233 SBSQ HOSP IP/OBS HIGH 50: CPT

## 2024-04-29 PROCEDURE — 76775 US EXAM ABDO BACK WALL LIM: CPT | Mod: 26

## 2024-04-29 RX ADMIN — Medication 650 MILLIGRAM(S): at 06:00

## 2024-04-29 RX ADMIN — Medication 650 MILLIGRAM(S): at 05:05

## 2024-04-29 RX ADMIN — PIPERACILLIN AND TAZOBACTAM 25 GRAM(S): 4; .5 INJECTION, POWDER, LYOPHILIZED, FOR SOLUTION INTRAVENOUS at 05:04

## 2024-04-29 RX ADMIN — PIPERACILLIN AND TAZOBACTAM 25 GRAM(S): 4; .5 INJECTION, POWDER, LYOPHILIZED, FOR SOLUTION INTRAVENOUS at 21:29

## 2024-04-29 RX ADMIN — PIPERACILLIN AND TAZOBACTAM 25 GRAM(S): 4; .5 INJECTION, POWDER, LYOPHILIZED, FOR SOLUTION INTRAVENOUS at 13:22

## 2024-04-29 NOTE — CONSULT NOTE ADULT - SUBJECTIVE AND OBJECTIVE BOX
HPI:   Patient is a 34y female with no significant past medical history who was admitted 4/26 with a 2 day history of fever, chills, and Rt flank pain, who is on Rx for a rt sided renal abscess.  She was treated for an E Coli UTI a few years ago, no other  infections.  She denies any hematuria of dysuria. She was started on zosyn and her temps are moderating.  She had a f/u sonogram which shows a slightly larger abscess.    REVIEW OF SYSTEMS:  All other review of systems negative (Comprehensive ROS)    PAST MEDICAL & SURGICAL HISTORY:  No pertinent past medical history      No pertinent past surgical history          Allergies    No Known Allergies    Intolerances        Antimicrobials Day #  :day 3  piperacillin/tazobactam IVPB.. 3.375 Gram(s) IV Intermittent every 8 hours    Other Medications:  acetaminophen     Tablet .. 650 milliGRAM(s) Oral every 6 hours PRN  aluminum hydroxide/magnesium hydroxide/simethicone Suspension 30 milliLiter(s) Oral every 4 hours PRN  influenza   Vaccine 0.5 milliLiter(s) IntraMuscular once  melatonin 3 milliGRAM(s) Oral at bedtime PRN  ondansetron Injectable 4 milliGRAM(s) IV Push every 8 hours PRN      FAMILY HISTORY:  FH: HTN (hypertension) (Mother)    FHx: heart disease (Mother)        SOCIAL HISTORY:  Smoking:  x   ETOH: x    Drug Use: x      T(F): 98.4 (04-29-24 @ 11:29), Max: 98.9 (04-28-24 @ 22:52)  HR: 78 (04-29-24 @ 11:29)  BP: 107/70 (04-29-24 @ 11:29)  RR: 18 (04-29-24 @ 11:29)  SpO2: 99% (04-29-24 @ 11:29)  Wt(kg): --    PHYSICAL EXAM:  General: alert, no acute distress  Eyes:  anicteric, no conjunctival injection, no discharge  Oropharynx: no lesions or injection 	  Neck: supple, without adenopathy  Lungs: clear to auscultation  Heart: regular rate and rhythm; no murmur, rubs or gallops  Abdomen: soft, nondistended, mild rt flank tenderness, without mass or organomegaly  Skin: no lesions  Extremities: no clubbing, cyanosis, or edema  Neurologic: alert, oriented, moves all extremities    LAB RESULTS:                        12.6   7.02  )-----------( 280      ( 28 Apr 2024 08:01 )             37.0     04-28    138  |  104  |  10  ----------------------------<  94  4.1   |  26  |  0.81    Ca    8.9      28 Apr 2024 08:01    TPro  7.4  /  Alb  3.2<L>  /  TBili  0.3  /  DBili  x   /  AST  36  /  ALT  77<H>  /  AlkPhos  91  04-28    LIVER FUNCTIONS - ( 28 Apr 2024 08:01 )  Alb: 3.2 g/dL / Pro: 7.4 g/dL / ALK PHOS: 91 U/L / ALT: 77 U/L / AST: 36 U/L / GGT: x                   MICROBIOLOGY:  RECENT CULTURES:  04-26 @ 15:55 Clean Catch Clean Catch (Midstream)     >100,000 CFU/ml Escherichia coli      04-26 @ 15:00 .Blood Blood-Peripheral     No growth at 48 Hours      04-26 @ 14:55 .Blood Blood-Peripheral     No growth at 48 Hours            RADIOLOGY REVIEWED:  < from: US Renal (04.29.24 @ 13:07) >  IMPRESSION:  Focal area of heterogeneous echotexture in the right upper to mid kidney   corresponding to CT abnormality may reflect a focal pyelonephritis with   evolving abscess. Recommend close follow-up to resolution after   appropriate treatment interva    < end of copied text >  < from: CT Abdomen and Pelvis w/ IV Cont (04.26.24 @ 16:57) >  IMPRESSION:  2.2 cm region of hypoenhancement within the right kidney, question   pyelonephritis with underlying abscess.    < end of copied text >

## 2024-04-29 NOTE — CONSULT NOTE ADULT - ASSESSMENT
33 yo female with right acute pyelonephritis and 2.2 cm renal abscess and associated sepsis   bcx and ucx sent and rec; s/p ceftriaxone  HIE reviewed    no need for abscess drainage at this time, will elect to treat with antibiotics and monitor clinical course  would broaden abx to Zosyn given past ecoli in 2022 was not pan sensitive.  would expect fever curve to down trend after 24-36 hours of antibiotics  pt understand taht she will cont to have fevers and flak pain over the next few days and that it is normal in the setting of kidney infection   can offer pyridium for "hot urination"    should pts clinical condition worsen or not respond to culture appropriate antibiotics will have to consider abscess drainage     Check renal sonogram on Monday to reassess size of abscess.     will follow  hayley Gomez 
Patient is a 34y female with no significant past medical history who was admitted 4/26 with a 2 day history of fever, chills, and Rt flank pain, who is on Rx for a rt sided renal abscess.  She was treated for an E Coli UTI a few years ago, no other  infections.  She denies any hematuria of dysuria. She was started on zosyn and her temps are moderating.  She had a f/u sonogram which shows a slightly larger abscess.  She appears to be clinically responding to zosyn- her temps are normal, her wbc count is now normal.  We are still awaiting sensitivities of E Coli  Suggest:  1 Continue zosyn  2 I would hope to switch to oral agent such as ceftin if isolate is sensitive  3 We can extend antibiotics to 3 weeks and repeat renal sonogram , however will await urology impression and advice. given clinical response perhaps we are seeing radiographic "lag", will await urology opinion.

## 2024-04-29 NOTE — DISCHARGE NOTE PROVIDER - PROVIDER TOKENS
PROVIDER:[TOKEN:[[id number]]] PROVIDER:[TOKEN:[[id number]]],PROVIDER:[TOKEN:[[id number]],FOLLOWUP:[2 weeks]]

## 2024-04-29 NOTE — DISCHARGE NOTE PROVIDER - CARE PROVIDER_API CALL
Laron Gonzales  Phone: [phone])-  Fax: ()-  Follow Up Time:    Laron Gonzales  Phone: ([phone]  Fax: ()-  Follow Up Time:     Lenny Ramirez  Urology  [address] [place] [id number]  Phone: [phone]  Fax: [phone]  Follow Up Time: 2 weeks

## 2024-04-29 NOTE — DISCHARGE NOTE PROVIDER - NSDCFUSCHEDAPPT_GEN_ALL_CORE_FT
[facility]  [address]  Scheduled Appointment: 06/13/2024     [facility]  [place]  Scheduled Appointment: 05/08/2024    [facility]  [facility]  Scheduled Appointment: 05/10/2024    [facility]  [address]  Scheduled Appointment: 06/13/2024

## 2024-04-29 NOTE — DISCHARGE NOTE NURSING/CASE MANAGEMENT/SOCIAL WORK - NSDCFUADDAPPT_GEN_ALL_CORE_FT
MD follow up:  Dr Laron Gonzales-- 5/10/24 at 2 PM We made you a hospital followup appointment with Dr. [facility] on 5/8/24 at 1:30pm.

## 2024-04-29 NOTE — DISCHARGE NOTE NURSING/CASE MANAGEMENT/SOCIAL WORK - PATIENT PORTAL LINK FT
You can access the FollowMyHealth Patient Portal offered by [facility] by registering at the following website: [email or web address]. By joining [facility]’s FollowMyHealth portal, you will also be able to view your health information using other applications (apps) compatible with our system.

## 2024-04-29 NOTE — DISCHARGE NOTE NURSING/CASE MANAGEMENT/SOCIAL WORK - NSDCPEFALRISK_GEN_ALL_CORE
For information on Fall & Injury Prevention, visit: [email or web address] OR  [email or web address] OR  https://www.cdc.gov/steadi/patient.html

## 2024-04-29 NOTE — CONSULT NOTE ADULT - CONSULT REASON
RN spoke to patient. Virtual visit scheduled for 5/29/2020. Patient agreeable.   
Renal abscess
Right renal abscess

## 2024-04-29 NOTE — DISCHARGE NOTE PROVIDER - NSDCCPCAREPLAN_GEN_ALL_CORE_FT
PRINCIPAL DISCHARGE DIAGNOSIS  Diagnosis: Pyelonephritis  Assessment and Plan of Treatment: You were admitted due to uti that went up to your kidneys. Underwent treatment with IV antibiotics and improved.You will continue oral antibiotic as directed for 2 more days and follow up at  clinic within the week.      SECONDARY DISCHARGE DIAGNOSES  Diagnosis: Renal abscess  Assessment and Plan of Treatment: Small small probable infectious collection seen on imaging in your kidney.     PRINCIPAL DISCHARGE DIAGNOSIS  Diagnosis: Pyelonephritis  Assessment and Plan of Treatment: You were admitted due to uti that went up to your kidneys. Underwent treatment with IV antibiotics and improved.You will continue oral antibiotic as directed for3 weeks and follow up at  clinic within the week.      SECONDARY DISCHARGE DIAGNOSES  Diagnosis: Renal abscess  Assessment and Plan of Treatment: Small small probable infectious collection seen on imaging in your kidney. Follow up renal US can be reordered from clinic after finishing 3 week course of antibiotic.

## 2024-04-29 NOTE — DISCHARGE NOTE PROVIDER - HOSPITAL COURSE
Patient is a 34yoF with no significant PMH complaining of R sided abdominal and flank pain for 2 days. Admitted with sepsis secondary to pyelonephritis with abscess. Pt was started on Zosyn given Hx of previous resistant UTI, blood cultures no growth, UCx grew E Coli       . Imaging on arrival showed renal abscess, too small to drain per urology. Hayder munoz today ....    Pt seen and evaluated at bedside found in NAD, headaches have improved. Pt has been afebrile,      Patient is a 34yoF with no significant PMH complaining of R sided abdominal and flank pain for 2 days. Admitted with sepsis secondary to pyelonephritis with abscess. Pt was started on Zosyn given Hx of previous resistant UTI, blood cultures no growth, UCx grew E Coli, pan sensitive, switched to PO Ceftin. Imaging on arrival showed renal abscess, too small to drain per urology. FU renal sonno 3 days later showed abscess progression. ID consulted, recommended to continue PO Ceftin x 3 weeks and repeat renal US post treatment in the ambulatory.     Pt seen and evaluated at bedside found in NAD, headaches have improved. Pt has been afebrile, stable VS, no leukocytosis on CBC. She is stable to be discharged home , fu at  clinic and outpatient imaging in 3-4 weeks.     Vital Signs Last 24 Hrs  T(C): 36.9 (30 Apr 2024 05:03), Max: 37.2 (29 Apr 2024 18:24)  T(F): 98.5 (30 Apr 2024 05:03), Max: 99 (29 Apr 2024 18:24)  HR: 78 (30 Apr 2024 05:03) (78 - 89)  BP: 100/64 (30 Apr 2024 05:03) (100/64 - 108/76)  BP(mean): --  RR: 16 (30 Apr 2024 05:03) (16 - 18)  SpO2: 97% (30 Apr 2024 05:03) (97% - 99%)    Parameters below as of 29 Apr 2024 21:17  Patient On (Oxygen Delivery Method): room air    < from: US Renal (04.29.24 @ 13:07) >    MPRESSION:  Focal area of heterogeneous echotexture in the right upper to mid kidney   corresponding to CT abnormality may reflect a focal pyelonephritis with   evolving abscess. Recommend close follow-up to resolution after   appropriate treatment interval    < end of copied text >

## 2024-04-29 NOTE — DISCHARGE NOTE PROVIDER - ATTENDING DISCHARGE PHYSICAL EXAMINATION:
T(C): 36.8 (04-30-24 @ 12:50), Max: 37.2 (04-29-24 @ 18:24)  HR: 80 (04-30-24 @ 12:50) (78 - 89)  BP: 108/72 (04-30-24 @ 12:50) (100/64 - 108/76)  RR: 16 (04-30-24 @ 12:50) (16 - 18)  SpO2: 98% (04-30-24 @ 12:50) (97% - 99%)  Wt(kg): --Vital Signs Last 24 Hrs  T(C): 36.8 (30 Apr 2024 12:50), Max: 37.2 (29 Apr 2024 18:24)  T(F): 98.2 (30 Apr 2024 12:50), Max: 99 (29 Apr 2024 18:24)  HR: 80 (30 Apr 2024 12:50) (78 - 89)  BP: 108/72 (30 Apr 2024 12:50) (100/64 - 108/76)  BP(mean): --  RR: 16 (30 Apr 2024 12:50) (16 - 18)  SpO2: 98% (30 Apr 2024 12:50) (97% - 99%)    Parameters below as of 30 Apr 2024 12:50  Patient On (Oxygen Delivery Method): room air        PHYSICAL EXAM:  GENERAL: NAD  HENT:  Atraumatic, Normocephalic; No tonsillar erythema, exudates, or enlargement; Moist mucous membranes;   EYES: EOMI, PERRLA, conjunctiva and sclera clear, no lid-lag  NECK: Supple, No JVD, Normal thyroid  NERVOUS SYSTEM:  CN II - XII intact; Sensation intact; Motor Strength 5/5 B/L upper and lower extremities. Alert and oriented x 3  CHEST/LUNG:  No rales, rhonchi, wheezing, or rubs; normal respiratory effort, no intercostal retractions; No pitting edema  HEART: Regular rate and rhythm; No murmurs, rubs, or gallops  ABDOMEN: Soft, Nontender, Nondistended; Bowel sounds present; No HSM  SKIN: No rashes or lesions; normal texture and temperature  PSYCH: Appropriate affect

## 2024-04-29 NOTE — DISCHARGE NOTE PROVIDER - CARE PROVIDERS DIRECT ADDRESSES
,DirectAddress_Unknown ,DirectAddress_Unknown,[email or web address]

## 2024-04-29 NOTE — DISCHARGE NOTE PROVIDER - NSDCCAREPROVSEEN_GEN_ALL_CORE_FT
Laurie Melchor, Brian Upton, Malissa Alexis, Becca Rider, Reshma Merritt, Andrea Bronson, Jessie Eduardo, Ricardo Gonzales, Laron

## 2024-04-30 VITALS
HEART RATE: 80 BPM | TEMPERATURE: 98 F | DIASTOLIC BLOOD PRESSURE: 72 MMHG | OXYGEN SATURATION: 98 % | SYSTOLIC BLOOD PRESSURE: 108 MMHG | RESPIRATION RATE: 16 BRPM

## 2024-04-30 LAB
ALBUMIN SERPL ELPH-MCNC: 3.2 G/DL — LOW (ref 3.3–5)
ALP SERPL-CCNC: 89 U/L — SIGNIFICANT CHANGE UP (ref 40–120)
ALT FLD-CCNC: 49 U/L — HIGH (ref 10–45)
ANION GAP SERPL CALC-SCNC: 9 MMOL/L — SIGNIFICANT CHANGE UP (ref 5–17)
AST SERPL-CCNC: 17 U/L — SIGNIFICANT CHANGE UP (ref 10–40)
BILIRUB SERPL-MCNC: 0.5 MG/DL — SIGNIFICANT CHANGE UP (ref 0.2–1.2)
BUN SERPL-MCNC: 8 MG/DL — SIGNIFICANT CHANGE UP (ref 7–23)
CALCIUM SERPL-MCNC: 8.9 MG/DL — SIGNIFICANT CHANGE UP (ref 8.4–10.5)
CHLORIDE SERPL-SCNC: 103 MMOL/L — SIGNIFICANT CHANGE UP (ref 96–108)
CO2 SERPL-SCNC: 27 MMOL/L — SIGNIFICANT CHANGE UP (ref 22–31)
CREAT SERPL-MCNC: 0.79 MG/DL — SIGNIFICANT CHANGE UP (ref 0.5–1.3)
EGFR: 99 ML/MIN/1.73M2 — SIGNIFICANT CHANGE UP
GLUCOSE SERPL-MCNC: 89 MG/DL — SIGNIFICANT CHANGE UP (ref 70–99)
HCT VFR BLD CALC: 35.7 % — SIGNIFICANT CHANGE UP (ref 34.5–45)
HGB BLD-MCNC: 12.3 G/DL — SIGNIFICANT CHANGE UP (ref 11.5–15.5)
MCHC RBC-ENTMCNC: 30.6 PG — SIGNIFICANT CHANGE UP (ref 27–34)
MCHC RBC-ENTMCNC: 34.5 GM/DL — SIGNIFICANT CHANGE UP (ref 32–36)
MCV RBC AUTO: 88.8 FL — SIGNIFICANT CHANGE UP (ref 80–100)
NRBC # BLD: 0 /100 WBCS — SIGNIFICANT CHANGE UP (ref 0–0)
PLATELET # BLD AUTO: 335 K/UL — SIGNIFICANT CHANGE UP (ref 150–400)
POTASSIUM SERPL-MCNC: 3.8 MMOL/L — SIGNIFICANT CHANGE UP (ref 3.5–5.3)
POTASSIUM SERPL-SCNC: 3.8 MMOL/L — SIGNIFICANT CHANGE UP (ref 3.5–5.3)
PROT SERPL-MCNC: 7.4 G/DL — SIGNIFICANT CHANGE UP (ref 6–8.3)
RBC # BLD: 4.02 M/UL — SIGNIFICANT CHANGE UP (ref 3.8–5.2)
RBC # FLD: 12.3 % — SIGNIFICANT CHANGE UP (ref 10.3–14.5)
SODIUM SERPL-SCNC: 139 MMOL/L — SIGNIFICANT CHANGE UP (ref 135–145)
WBC # BLD: 6.6 K/UL — SIGNIFICANT CHANGE UP (ref 3.8–10.5)
WBC # FLD AUTO: 6.6 K/UL — SIGNIFICANT CHANGE UP (ref 3.8–10.5)

## 2024-04-30 PROCEDURE — 99291 CRITICAL CARE FIRST HOUR: CPT | Mod: 25

## 2024-04-30 PROCEDURE — 93005 ELECTROCARDIOGRAM TRACING: CPT

## 2024-04-30 PROCEDURE — 81001 URINALYSIS AUTO W/SCOPE: CPT

## 2024-04-30 PROCEDURE — 99239 HOSP IP/OBS DSCHRG MGMT >30: CPT

## 2024-04-30 PROCEDURE — 74177 CT ABD & PELVIS W/CONTRAST: CPT | Mod: MC

## 2024-04-30 PROCEDURE — 85025 COMPLETE CBC W/AUTO DIFF WBC: CPT

## 2024-04-30 PROCEDURE — 76775 US EXAM ABDO BACK WALL LIM: CPT

## 2024-04-30 PROCEDURE — 83605 ASSAY OF LACTIC ACID: CPT

## 2024-04-30 PROCEDURE — 85610 PROTHROMBIN TIME: CPT

## 2024-04-30 PROCEDURE — 85027 COMPLETE CBC AUTOMATED: CPT

## 2024-04-30 PROCEDURE — 87186 SC STD MICRODIL/AGAR DIL: CPT

## 2024-04-30 PROCEDURE — 80053 COMPREHEN METABOLIC PANEL: CPT

## 2024-04-30 PROCEDURE — 36415 COLL VENOUS BLD VENIPUNCTURE: CPT

## 2024-04-30 PROCEDURE — 84702 CHORIONIC GONADOTROPIN TEST: CPT

## 2024-04-30 PROCEDURE — 96368 THER/DIAG CONCURRENT INF: CPT

## 2024-04-30 PROCEDURE — 85730 THROMBOPLASTIN TIME PARTIAL: CPT

## 2024-04-30 PROCEDURE — 99231 SBSQ HOSP IP/OBS SF/LOW 25: CPT

## 2024-04-30 PROCEDURE — 87040 BLOOD CULTURE FOR BACTERIA: CPT

## 2024-04-30 PROCEDURE — 96365 THER/PROPH/DIAG IV INF INIT: CPT

## 2024-04-30 PROCEDURE — 96375 TX/PRO/DX INJ NEW DRUG ADDON: CPT

## 2024-04-30 PROCEDURE — 87086 URINE CULTURE/COLONY COUNT: CPT

## 2024-04-30 RX ORDER — CEFUROXIME AXETIL 250 MG
1 TABLET ORAL
Qty: 42 | Refills: 0
Start: 2024-04-30 | End: 2024-05-20

## 2024-04-30 RX ORDER — CEFUROXIME AXETIL 250 MG
500 TABLET ORAL EVERY 12 HOURS
Refills: 0 | Status: DISCONTINUED | OUTPATIENT
Start: 2024-04-30 | End: 2024-04-30

## 2024-04-30 RX ADMIN — PIPERACILLIN AND TAZOBACTAM 25 GRAM(S): 4; .5 INJECTION, POWDER, LYOPHILIZED, FOR SOLUTION INTRAVENOUS at 05:59

## 2024-04-30 NOTE — PROGRESS NOTE ADULT - ASSESSMENT
A: 35 yo female with right acute pyelonephritis and 2.2 cm renal abscess and associated sepsis    remains afebrile and nontoxic with antibiotic therapy  renal sono with slight increase in abscess size.   No need for drainage as clinically pt is improving  agree with ID consult for 3 weeks of oral abx  will fu with Dr. Gomez and have repeat imaging to assess for resolution     can expect flank and abd pain to dec over next few weeks.  er precautions given       should pts clinical condition worsen or not respond to culture appropriate antibiotics will have to consider abscess drainage             
Patient is a 34yoF with no significant PMH complaining of R sided abdominal and flank pain for 2 days. Admitted with sepsis secondary to pyelonephritis with abscess    #Sepsis secondary to pyelonephritis with abscess  -sepsis now resolved  -CTAP: 2.2 cm region of hypoenhancement within the right kidney, question pyelonephritis with underlying abscess.  -Urology consulted/following-no need for abscess drainage at this time, will elect to treat with antibiotics and monitor clinical course  -c/w Zosyn  per urology recs (3)  -BCx no growth prelim   -F/U urine culture  -Monitor Fever curve and WBC  - repeat renal sonno on Monday to monitor abscess per urology, ordered    #DVT prophylaxis  -low risk, ICDs, encouraged ambulation  
Patient is a 34yoF with no significant PMH complaining of R sided abdominal and flank pain for 2 days. Admitted with sepsis secondary to pyelonephritis with abscess    #Sepsis secondary to pyelonephritis with abscess  -sepsis now resolved  -CTAP: 2.2 cm region of hypoenhancement within the right kidney, question pyelonephritis with underlying abscess.  -fu renal sonno showing evolving abscess  -c/w Zosyn  per urology recs (4)  -BCx no growth prelim   -UCx growing E Coli, sensitivities pending   -Monitor Fever curve and WBC  -ID evaluation-Dr Ortega     #DVT prophylaxis  -low risk, ICDs, encouraged ambulation  
A: 35 yo female with right acute pyelonephritis and 2.2 cm renal abscess and associated sepsis     remains afebrile and nontoxic with antibiotic therapy  expect sensitivity results today   for renal sono to assess abscess today   Continue Zosyn given past ecoli in 2022 was not pan sensitive.    pt understand that she will cont to have fevers and flank pain over the next few days and that it is normal in the setting of kidney infection       should pts clinical condition worsen or not respond to culture appropriate antibiotics will have to consider abscess drainage             
Patient is a 34yoF with no significant PMH complaining of R sided abdominal and flank pain for 2 days. Admitted with sepsis secondary to pyelonephritis with abscess    #Sepsis secondary to pyelonephritis with abscess  -sepsis now resolved  -CTAP: 2.2 cm region of hypoenhancement within the right kidney, question pyelonephritis with underlying abscess.  -Urology consulted/following-no need for abscess drainage at this time, will elect to treat with antibiotics and monitor clinical course  -c/w Zosyn  per urology recs (2)   -F/U blood cultures x 2  -F/U urine culture  -Monitor Fever curve and WBC  - repeat renal sonno on Monday to monitor abscess per urology    #DVT prophylaxis  -low risk, ICDs, encouraged ambulation  
Patient is a 34yoF with no significant PMH complaining of R sided abdominal and flank pain for 2 days. Admitted with sepsis secondary to pyelonephritis with abscess    #Sepsis secondary to pyelonephritis with abscess  -sepsis now resolved  -CTAP: 2.2 cm region of hypoenhancement within the right kidney, question pyelonephritis with underlying abscess.  -fu renal sonno showing evolving abscess  -narrow abx to Ceftin per sensitivities  -BCx no growth  -Monitor Fever curve and WBC  -ID recs appreciated-continue PO Ceftin x 3 weeks with fu renal US outpatient     #DVT prophylaxis  -low risk, ICDs, encouraged ambulation    Discharge today if no further urological intervention needed.   
A: 35 yo female with right acute pyelonephritis and 2.2 cm renal abscess and associated sepsis     P:  continue to  treat with antibiotics and monitor clinical course  Continue Zosyn given past ecoli in 2022 was not pan sensitive.    pt understand taht she will cont to have fevers and flak pain over the next few days and that it is normal in the setting of kidney infection   can offer pyridium for "hot urination"    should pts clinical condition worsen or not respond to culture appropriate antibiotics will have to consider abscess drainage     Check renal sonogram on Monday to reassess size of abscess.     Plan discussed with Dr. Bronson

## 2024-04-30 NOTE — PROGRESS NOTE ADULT - REASON FOR ADMISSION
Sepsis 2/2 pyelonephritis with abscess

## 2024-04-30 NOTE — CHART NOTE - NSCHARTNOTEFT_GEN_A_CORE
To Whom it may concern,     Please note that Ms. Cate Villeda was admitted to [facility] from April 26th, 2024 to April 30th, 2024. Please excuse her from work for the above mentioned dates. Patient can return to work starting 5/1/2024.     Sincerely,  Brian Rossi MD To Whom it may concern,     Please note that Ms. Cate Villeda was admitted to [facility] from April 26th, 2024 to April 30th, 2024. Please excuse her from work for the above mentioned dates. After some rest, Patient can return to work starting 5/3/2024.     Sincerely,  Brian Rossi

## 2024-04-30 NOTE — PROGRESS NOTE ADULT - SUBJECTIVE AND OBJECTIVE BOX
Interval HPI:  Patient is a 33yo F with no significant PMH who came in with abd pain and urinary complaint, admitted for sepsis 2/2 pyelonephritis.     Pt evaluated at bedside found in NAD, reports improvement in symptoms, tolerating PO diet, HA improving.       MEDICATIONS  (STANDING):  enoxaparin Injectable 40 milliGRAM(s) SubCutaneous every 24 hours  influenza   Vaccine 0.5 milliLiter(s) IntraMuscular once  piperacillin/tazobactam IVPB.. 3.375 Gram(s) IV Intermittent every 8 hours    MEDICATIONS  (PRN):  acetaminophen     Tablet .. 650 milliGRAM(s) Oral every 6 hours PRN Temp greater or equal to 38C (100.4F), Mild Pain (1 - 3)  aluminum hydroxide/magnesium hydroxide/simethicone Suspension 30 milliLiter(s) Oral every 4 hours PRN Dyspepsia  melatonin 3 milliGRAM(s) Oral at bedtime PRN Insomnia  ondansetron Injectable 4 milliGRAM(s) IV Push every 8 hours PRN Nausea and/or Vomiting      Vital Signs Last 24 Hrs  T(C): 36.9 (29 Apr 2024 11:29), Max: 37.2 (28 Apr 2024 22:52)  T(F): 98.4 (29 Apr 2024 11:29), Max: 98.9 (28 Apr 2024 22:52)  HR: 78 (29 Apr 2024 11:29) (78 - 91)  BP: 107/70 (29 Apr 2024 11:29) (95/59 - 115/74)  BP(mean): --  RR: 18 (29 Apr 2024 11:29) (15 - 18)  SpO2: 99% (29 Apr 2024 11:29) (98% - 99%)    Parameters below as of 29 Apr 2024 11:29  Patient On (Oxygen Delivery Method): room air              GEN: NAD, comfortable, resting in bed  HEENT: NC/AT, EOMI, PERRLA, MMM, clear conjunctiva and sclera, normal hearing, no nasal discharge, throat clear, no thrush, normal dentition.   NECK: supple, no JVD, no LAD, no thyromegaly  BACK:  ROM intact, no spinal/paraspinal tenderness  CV: S1S2, RRR, no mumur  RESP: good air movement, CTABL, no rales, rhonchi or wheezing, respirations unlabored  ABD: +BS, soft, ND, NT, no guarding, no rigidity, no HSM  EXT: +2 radial and pedal pulses, no edema, no calve tenderness  SKIN: No visible Rashes or Ulcers  MSK: ROM intact in all extremities  NEURO:  sensory and CN 2-12 Grossly intact, no motor deficits, no, saddle anesthesia, AOx3  PSYCH: normal behavior         LABS:                          11.8   9.85  )-----------( 257      ( 27 Apr 2024 06:06 )             35.8     27 Apr 2024 06:06    139    |  106    |  8      ----------------------------<  95     3.7     |  25     |  0.80     Ca    8.6        27 Apr 2024 06:06    TPro  6.6    /  Alb  3.0    /  TBili  0.6    /  DBili  x      /  AST  51     /  ALT  68     /  AlkPhos  75     27 Apr 2024 06:06    LIVER FUNCTIONS - ( 27 Apr 2024 06:06 )  Alb: 3.0 g/dL / Pro: 6.6 g/dL / ALK PHOS: 75 U/L / ALT: 68 U/L / AST: 51 U/L / GGT: x           PT/INR - ( 26 Apr 2024 15:00 )   PT: 13.1 sec;   INR: 1.15 ratio         PTT - ( 26 Apr 2024 15:00 )  PTT:30.3 sec  CAPILLARY BLOOD GLUCOSE            Urinalysis Basic - ( 27 Apr 2024 06:06 )    Color: x / Appearance: x / SG: x / pH: x  Gluc: 95 mg/dL / Ketone: x  / Bili: x / Urobili: x   Blood: x / Protein: x / Nitrite: x   Leuk Esterase: x / RBC: x / WBC x   Sq Epi: x / Non Sq Epi: x / Bacteria: x        RADIOLOGY:      < from: US Renal (04.29.24 @ 13:07) >  FINDINGS:  Right kidney: 9.8 cm. No  hydronephrosis or calculi. Heterogeneous focal   area the right upper to mid the pole measuring approximately 2.9 x 2.7 cm   corresponding to a hypoattenuating focus noted on CT. This may represent   focal pyelonephritis, with possible evolving abscess. Recommend close   follow-up, to resolution to exclude underlying lesion    Left kidney: 11.3 cm. No renal mass, hydronephrosis or calculi.    Urinary bladder: Within normal limits.    IMPRESSION:  Focal area of heterogeneous echotexture in the right upper to mid kidney   corresponding to CT abnormality may reflect a focal pyelonephritis with   evolving abscess. Recommend close follow-up to resolution after   appropriate treatment interval    < end of copied text >        
Interval HPI:  Patient is a 35yo F with no significant PMH who came in with abd pain and urinary complaint, admitted for sepsis 2/2 pyelonephritis.     Pt evaluated at bedside found in NAD, reports improvement in symptoms, tolerating PO diet, HA improving.       MEDICATIONS  (STANDING):  enoxaparin Injectable 40 milliGRAM(s) SubCutaneous every 24 hours  influenza   Vaccine 0.5 milliLiter(s) IntraMuscular once  piperacillin/tazobactam IVPB.. 3.375 Gram(s) IV Intermittent every 8 hours    MEDICATIONS  (PRN):  acetaminophen     Tablet .. 650 milliGRAM(s) Oral every 6 hours PRN Temp greater or equal to 38C (100.4F), Mild Pain (1 - 3)  aluminum hydroxide/magnesium hydroxide/simethicone Suspension 30 milliLiter(s) Oral every 4 hours PRN Dyspepsia  melatonin 3 milliGRAM(s) Oral at bedtime PRN Insomnia  ondansetron Injectable 4 milliGRAM(s) IV Push every 8 hours PRN Nausea and/or Vomiting      Vital Signs Last 24 Hrs  T(C): 36.9 (29 Apr 2024 11:29), Max: 37.2 (28 Apr 2024 22:52)  T(F): 98.4 (29 Apr 2024 11:29), Max: 98.9 (28 Apr 2024 22:52)  HR: 78 (29 Apr 2024 11:29) (78 - 91)  BP: 107/70 (29 Apr 2024 11:29) (95/59 - 115/74)  BP(mean): --  RR: 18 (29 Apr 2024 11:29) (15 - 18)  SpO2: 99% (29 Apr 2024 11:29) (98% - 99%)    Parameters below as of 29 Apr 2024 11:29  Patient On (Oxygen Delivery Method): room air              GEN: NAD, comfortable, resting in bed  HEENT: NC/AT, EOMI, PERRLA, MMM, clear conjunctiva and sclera, normal hearing, no nasal discharge, throat clear, no thrush, normal dentition.   NECK: supple, no JVD, no LAD, no thyromegaly  BACK:  ROM intact, no spinal/paraspinal tenderness  CV: S1S2, RRR, no mumur  RESP: good air movement, CTABL, no rales, rhonchi or wheezing, respirations unlabored  ABD: +BS, soft, ND, NT, no guarding, no rigidity, no HSM  EXT: +2 radial and pedal pulses, no edema, no calve tenderness  SKIN: No visible Rashes or Ulcers  MSK: ROM intact in all extremities  NEURO:  sensory and CN 2-12 Grossly intact, no motor deficits, no, saddle anesthesia, AOx3  PSYCH: normal behavior         LABS:                          11.8   9.85  )-----------( 257      ( 27 Apr 2024 06:06 )             35.8     27 Apr 2024 06:06    139    |  106    |  8      ----------------------------<  95     3.7     |  25     |  0.80     Ca    8.6        27 Apr 2024 06:06    TPro  6.6    /  Alb  3.0    /  TBili  0.6    /  DBili  x      /  AST  51     /  ALT  68     /  AlkPhos  75     27 Apr 2024 06:06    LIVER FUNCTIONS - ( 27 Apr 2024 06:06 )  Alb: 3.0 g/dL / Pro: 6.6 g/dL / ALK PHOS: 75 U/L / ALT: 68 U/L / AST: 51 U/L / GGT: x           PT/INR - ( 26 Apr 2024 15:00 )   PT: 13.1 sec;   INR: 1.15 ratio         PTT - ( 26 Apr 2024 15:00 )  PTT:30.3 sec  CAPILLARY BLOOD GLUCOSE            Urinalysis Basic - ( 27 Apr 2024 06:06 )    Color: x / Appearance: x / SG: x / pH: x  Gluc: 95 mg/dL / Ketone: x  / Bili: x / Urobili: x   Blood: x / Protein: x / Nitrite: x   Leuk Esterase: x / RBC: x / WBC x   Sq Epi: x / Non Sq Epi: x / Bacteria: x        RADIOLOGY:      < from: US Renal (04.29.24 @ 13:07) >  FINDINGS:  Right kidney: 9.8 cm. No  hydronephrosis or calculi. Heterogeneous focal   area the right upper to mid the pole measuring approximately 2.9 x 2.7 cm   corresponding to a hypoattenuating focus noted on CT. This may represent   focal pyelonephritis, with possible evolving abscess. Recommend close   follow-up, to resolution to exclude underlying lesion    Left kidney: 11.3 cm. No renal mass, hydronephrosis or calculi.    Urinary bladder: Within normal limits.    IMPRESSION:  Focal area of heterogeneous echotexture in the right upper to mid kidney   corresponding to CT abnormality may reflect a focal pyelonephritis with   evolving abscess. Recommend close follow-up to resolution after   appropriate treatment interval    < end of copied text >        
Interval HPI:  Patient is a 35yo F with no significant PMH who came in with abd pain and urinary complaint, admitted for sepsis 2/2 pyelonephritis.     Pt evaluated at bedside found in NAD, reports improvement in symptoms, tolerating PO diet.       MEDICATIONS  (STANDING):  enoxaparin Injectable 40 milliGRAM(s) SubCutaneous every 24 hours  influenza   Vaccine 0.5 milliLiter(s) IntraMuscular once  piperacillin/tazobactam IVPB.. 3.375 Gram(s) IV Intermittent every 8 hours    MEDICATIONS  (PRN):  acetaminophen     Tablet .. 650 milliGRAM(s) Oral every 6 hours PRN Temp greater or equal to 38C (100.4F), Mild Pain (1 - 3)  aluminum hydroxide/magnesium hydroxide/simethicone Suspension 30 milliLiter(s) Oral every 4 hours PRN Dyspepsia  melatonin 3 milliGRAM(s) Oral at bedtime PRN Insomnia  ondansetron Injectable 4 milliGRAM(s) IV Push every 8 hours PRN Nausea and/or Vomiting      Vital Signs Last 24 Hrs  T(C): 37.1 (27 Apr 2024 13:00), Max: 37.6 (26 Apr 2024 19:33)  T(F): 98.7 (27 Apr 2024 13:00), Max: 99.7 (26 Apr 2024 19:33)  HR: 99 (27 Apr 2024 13:00) (85 - 113)  BP: 112/74 (27 Apr 2024 13:00) (96/63 - 119/78)  BP(mean): --  RR: 20 (27 Apr 2024 13:00) (17 - 25)  SpO2: 100% (27 Apr 2024 13:00) (99% - 100%)    Parameters below as of 27 Apr 2024 13:00  Patient On (Oxygen Delivery Method): room air        GEN: NAD, comfortable, resting in bed  HEENT: NC/AT, EOMI, PERRLA, MMM, clear conjunctiva and sclera, normal hearing, no nasal discharge, throat clear, no thrush, normal dentition.   NECK: supple, no JVD, no LAD, no thyromegaly  BACK:  ROM intact, no spinal/paraspinal tenderness  CV: S1S2, RRR, no mumur  RESP: good air movement, CTABL, no rales, rhonchi or wheezing, respirations unlabored  ABD: +BS, soft, ND, NT, no guarding, no rigidity, no HSM  EXT: +2 radial and pedal pulses, no edema, no calve tenderness  SKIN: No visible Rashes or Ulcers  MSK: ROM intact in all extremities  NEURO:  sensory and CN 2-12 Grossly intact, no motor deficits, no, saddle anesthesia, AOx3  PSYCH: normal behavior         LABS:                          11.8   9.85  )-----------( 257      ( 27 Apr 2024 06:06 )             35.8     27 Apr 2024 06:06    139    |  106    |  8      ----------------------------<  95     3.7     |  25     |  0.80     Ca    8.6        27 Apr 2024 06:06    TPro  6.6    /  Alb  3.0    /  TBili  0.6    /  DBili  x      /  AST  51     /  ALT  68     /  AlkPhos  75     27 Apr 2024 06:06    LIVER FUNCTIONS - ( 27 Apr 2024 06:06 )  Alb: 3.0 g/dL / Pro: 6.6 g/dL / ALK PHOS: 75 U/L / ALT: 68 U/L / AST: 51 U/L / GGT: x           PT/INR - ( 26 Apr 2024 15:00 )   PT: 13.1 sec;   INR: 1.15 ratio         PTT - ( 26 Apr 2024 15:00 )  PTT:30.3 sec  CAPILLARY BLOOD GLUCOSE            Urinalysis Basic - ( 27 Apr 2024 06:06 )    Color: x / Appearance: x / SG: x / pH: x  Gluc: 95 mg/dL / Ketone: x  / Bili: x / Urobili: x   Blood: x / Protein: x / Nitrite: x   Leuk Esterase: x / RBC: x / WBC x   Sq Epi: x / Non Sq Epi: x / Bacteria: x        RADIOLOGY:        
Subjective  no further fevers, but subjectively have sweats and chills, overall feels improved but cont to have right abd pain   Objective    Vital signs  T(F): , Max: 98.9 (04-28-24 @ 22:52)  HR: 80 (04-29-24 @ 05:07)  BP: 95/59 (04-29-24 @ 05:07)  SpO2: 98% (04-29-24 @ 05:07)  Wt(kg): --    Output       Gen awake alert nad axox3 nontoxic appearing   Abd flat soft ntnd   Back min right cvat    nonpalp bladder no suprapubic tenderness     Labs  no am labs 4/29 ordered   04-28 @ 08:01    WBC 7.02  / Hct 37.0  / SCr 0.81       Urine Cx: Culture - Urine (04.26.24 @ 15:55)    Specimen Source: Clean Catch Clean Catch (Midstream)   Culture Results:   >100,000 CFU/ml Escherichia coli      Blood Cx: Culture - Blood (04.26.24 @ 15:00)    Specimen Source: .Blood Blood-Peripheral   Culture Results:   No growth at 48 Hours      Imaging  < from: CT Abdomen and Pelvis w/ IV Cont (04.26.24 @ 16:57) >  FINDINGS:  LOWER CHEST: Clear lung bases.    LIVER: Within normal limits.  BILE DUCTS: Normal caliber.  GALLBLADDER: Within normal limits.  SPLEEN: Within normal limits.  PANCREAS: Within normal limits.  ADRENALS: Within normal limits.  KIDNEYS/URETERS: Right renal cortical scarring. 2.2 cm region of   hypoenhancement in the right kidney. Adjacent cyst or diverticulum in the   right kidney. No hydronephrosis.    BLADDER: Minimally distended.  REPRODUCTIVE ORGANS: Uterus and adnexa within normal limits.    BOWEL: No bowel obstruction. Appendix is within normal limits.  PERITONEUM: No ascites.  VESSELS: Within normal limits.  RETROPERITONEUM/LYMPH NODES: No lymphadenopathy.  ABDOMINAL WALL: Within normal limits.  BONES: Within normal limits.    IMPRESSION:  2.2 cm region of hypoenhancement within the right kidney, question   pyelonephritis with underlying abscess.        --- End of Report ---              < end of copied text >  
INTERVAL HPI/OVERNIGHT EVENTS:  Pt. seen and examined at bedside resting comfortably.  C/o right flank and abd tenderness  Denies fever/chills, chest pain, dyspnea, cough, dizziness.     Vital Signs Last 24 Hrs  T(C): 36.4 (27 Apr 2024 05:43), Max: 37.6 (26 Apr 2024 19:33)  T(F): 97.6 (27 Apr 2024 05:43), Max: 99.7 (26 Apr 2024 19:33)  HR: 85 (27 Apr 2024 05:43) (85 - 113)  BP: 107/71 (27 Apr 2024 06:58) (96/63 - 119/78)  BP(mean): --  RR: 20 (27 Apr 2024 05:43) (17 - 25)  SpO2: 99% (27 Apr 2024 05:43) (99% - 100%)    Parameters below as of 27 Apr 2024 05:43  Patient On (Oxygen Delivery Method): room air        PHYSICAL EXAM:    Gen: awake alert NAD diaphoretic nontoxic appearing   Pulm: No respiratory distress, no subcostal retractions, no accessory muscle use   CV: Not tachycardic   Abd: flat Soft, tender to right abd, ND,  : nonpalp bladder no suprapubic tenderness   Moving all extremities  NEURO: A&Ox3    I&O's Detail      LABS:                        11.8   9.85  )-----------( 257      ( 27 Apr 2024 06:06 )             35.8     04-27    139  |  106  |  8   ----------------------------<  95  3.7   |  25  |  0.80    Ca    8.6      27 Apr 2024 06:06    TPro  6.6  /  Alb  3.0<L>  /  TBili  0.6  /  DBili  x   /  AST  51<H>  /  ALT  68<H>  /  AlkPhos  75  04-27    PT/INR - ( 26 Apr 2024 15:00 )   PT: 13.1 sec;   INR: 1.15 ratio         PTT - ( 26 Apr 2024 15:00 )  PTT:30.3 sec  Urinalysis Basic - ( 27 Apr 2024 06:06 )    Color: x / Appearance: x / SG: x / pH: x  Gluc: 95 mg/dL / Ketone: x  / Bili: x / Urobili: x   Blood: x / Protein: x / Nitrite: x   Leuk Esterase: x / RBC: x / WBC x   Sq Epi: x / Non Sq Epi: x / Bacteria: x        type    RADIOLOGY & ADDITIONAL STUDIES:    Impression: 33 yo female with right acute pyelonephritis and 2.2 cm renal abscess and associated sepsis   bcx and ucx sent and rec; s/p ceftriaxone  HIE reviewed    no need for abscess drainage at this time, will elect to treat with antibiotics and monitor clinical course  Continue Zosyn given past ecoli in 2022 was not pan sensitive.  would expect fever curve to down trend after 24-36 hours of antibiotics  pt understand taht she will cont to have fevers and flak pain over the next few days and that it is normal in the setting of kidney infection   can offer pyridium for "hot urination"    should pts clinical condition worsen or not respond to culture appropriate antibiotics will have to consider abscess drainage     Check renal sonogram on Monday to reassess size of abscess.     Plan discussed with Dr. Bronson
Pt. seen and examined at bedside resting comfortably.  No new complaints. Denies fever/chills, chest pain, dyspnea, cough, dizziness. Flank pain improving but still exists No dysuria or hematuria    Vital Signs Last 24 Hrs  T(C): 36.5 (28 Apr 2024 05:13), Max: 37.1 (27 Apr 2024 13:00)  T(F): 97.7 (28 Apr 2024 05:13), Max: 98.7 (27 Apr 2024 13:00)  HR: 77 (28 Apr 2024 05:13) (77 - 99)  BP: 96/63 (28 Apr 2024 05:13) (96/63 - 124/80)  BP(mean): --  RR: 14 (28 Apr 2024 05:13) (14 - 20)  SpO2: 100% (28 Apr 2024 05:13) (100% - 100%)    Parameters below as of 28 Apr 2024 05:13  Patient On (Oxygen Delivery Method): room air    PE: : nonpalp bladder no suprapubic tenderness no CVA TTP                          12.6   7.02  )-----------( 280      ( 28 Apr 2024 08:01 )             37.0   04-28    138  |  104  |  10  ----------------------------<  94  4.1   |  26  |  0.81    Ca    8.9      28 Apr 2024 08:01    TPro  7.4  /  Alb  3.2<L>  /  TBili  0.3  /  DBili  x   /  AST  36  /  ALT  77<H>  /  AlkPhos  91  04-28      Urine culture results pending      
Subjective  feeling much better   oob   Objective    Vital signs  T(F): , Max: 99 (04-29-24 @ 18:24)  HR: 78 (04-30-24 @ 05:03)  BP: 100/64 (04-30-24 @ 05:03)  SpO2: 97% (04-30-24 @ 05:03)  Wt(kg): --    Output     04-30 @ 07:01  -  04-30 @ 12:35  --------------------------------------------------------  IN: 120 mL / OUT: 0 mL / NET: 120 mL        Gen awake alert nad axox3  Abd soft ntnd   Back no cvat bl     nonpalp bladder   Labs      04-30 @ 09:19                          12.3   6.60  )-----------( 335      ( 30 Apr 2024 09:19 )             35.7     04-30    139  |  103  |  8   ----------------------------<  89  3.8   |  27  |  0.79    Ca    8.9      30 Apr 2024 09:19    TPro  7.4  /  Alb  3.2<L>  /  TBili  0.5  /  DBili  x   /  AST  17  /  ALT  49<H>  /  AlkPhos  89  04-30         Urine Cx: Culture - Urine (04.26.24 @ 15:55)    -  Amoxicillin/Clavulanic Acid: S <=8/4   -  Ampicillin: R >16 These ampicillin results predict results for amoxicillin   -  Ampicillin/Sulbactam: I 16/8   -  Aztreonam: S <=4   -  Cefazolin: S <=2 For uncomplicated UTI with K. pneumoniae, E. coli, or P. mirablis: CHRISTOPH <=16 is sensitive and CHRISTOPH >=32 is resistant. This also predicts results for oral agents cefaclor, cefdinir, cefpodoxime, cefprozil, cefuroxime axetil, cephalexin and locarbef for uncomplicated UTI. Note that some isolates may be susceptible to these agents while testing resistant to cefazolin.   -  Cefepime: S <=2   -  Cefoxitin: S <=8   -  Ceftriaxone: S <=1   -  Cefuroxime: S <=4   -  Ciprofloxacin: S <=0.25   -  Ertapenem: S <=0.5   -  Gentamicin: R >8   -  Imipenem: S <=1   -  Levofloxacin: S <=0.5   -  Meropenem: S <=1   -  Nitrofurantoin: S <=32 Should not be used to treat pyelonephritis   -  Piperacillin/Tazobactam: S <=8   -  Tobramycin: R 8   -  Trimethoprim/Sulfamethoxazole: R >2/38   Specimen Source: Clean Catch Clean Catch (Midstream)   Culture Results:   >100,000 CFU/ml Escherichia coli   Organism Identification: Escherichia coli   Organism: Escherichia coli   Method Type: CHRISTOPH    Imaging  < from: US Renal (04.29.24 @ 13:07) >  FINDINGS:  Right kidney: 9.8 cm. No  hydronephrosis or calculi. Heterogeneous focal   area the right upper to mid the pole measuring approximately 2.9 x 2.7 cm   corresponding to a hypoattenuating focus noted on CT. This may represent   focal pyelonephritis, with possible evolving abscess. Recommend close   follow-up, to resolution to exclude underlying lesion    Left kidney: 11.3 cm. No renal mass, hydronephrosis or calculi.    Urinary bladder: Within normal limits.    IMPRESSION:  Focal area of heterogeneous echotexture in the right upper to mid kidney   corresponding to CT abnormality may reflect a focal pyelonephritis with   evolving abscess. Recommend close follow-up to resolution after   appropriate treatment interval        --- End of Report ---        < end of copied text >  < from: CT Abdomen and Pelvis w/ IV Cont (04.26.24 @ 16:57) >  PROCEDURE:  CT of the Abdomen and Pelvis was performed.  Sagittal and coronal reformats were performed.    FINDINGS:  LOWER CHEST: Clear lung bases.    LIVER: Within normal limits.  BILE DUCTS: Normal caliber.  GALLBLADDER: Within normal limits.  SPLEEN: Within normal limits.  PANCREAS: Within normal limits.  ADRENALS: Within normal limits.  KIDNEYS/URETERS: Right renal cortical scarring. 2.2 cm region of   hypoenhancement in the right kidney. Adjacent cyst or diverticulum in the   right kidney. No hydronephrosis.    BLADDER: Minimally distended.  REPRODUCTIVE ORGANS: Uterus and adnexa within normal limits.    BOWEL: No bowel obstruction. Appendix is within normal limits.  PERITONEUM: No ascites.  VESSELS: Within normal limits.  RETROPERITONEUM/LYMPH NODES: No lymphadenopathy.  ABDOMINAL WALL: Within normal limits.  BONES: Within normal limits.    IMPRESSION:  2.2 cm region of hypoenhancement within the right kidney, question   pyelonephritis with underlying abscess.      < end of copied text >  
Interval HPI:  Patient is a 33yo F with no significant PMH who came in with abd pain and urinary complaint, admitted for sepsis 2/2 pyelonephritis.     Pt evaluated at bedside found in NAD, reports improvement in symptoms, tolerating PO diet, reports had headaches overnight.       MEDICATIONS  (STANDING):  enoxaparin Injectable 40 milliGRAM(s) SubCutaneous every 24 hours  influenza   Vaccine 0.5 milliLiter(s) IntraMuscular once  piperacillin/tazobactam IVPB.. 3.375 Gram(s) IV Intermittent every 8 hours    MEDICATIONS  (PRN):  acetaminophen     Tablet .. 650 milliGRAM(s) Oral every 6 hours PRN Temp greater or equal to 38C (100.4F), Mild Pain (1 - 3)  aluminum hydroxide/magnesium hydroxide/simethicone Suspension 30 milliLiter(s) Oral every 4 hours PRN Dyspepsia  melatonin 3 milliGRAM(s) Oral at bedtime PRN Insomnia  ondansetron Injectable 4 milliGRAM(s) IV Push every 8 hours PRN Nausea and/or Vomiting      Vital Signs Last 24 Hrs  T(C): 36.5 (28 Apr 2024 05:13), Max: 37.1 (27 Apr 2024 13:00)  T(F): 97.7 (28 Apr 2024 05:13), Max: 98.7 (27 Apr 2024 13:00)  HR: 77 (28 Apr 2024 05:13) (77 - 99)  BP: 96/63 (28 Apr 2024 05:13) (96/63 - 124/80)  BP(mean): --  RR: 14 (28 Apr 2024 05:13) (14 - 20)  SpO2: 100% (28 Apr 2024 05:13) (100% - 100%)    Parameters below as of 28 Apr 2024 05:13  Patient On (Oxygen Delivery Method): room air            GEN: NAD, comfortable, resting in bed  HEENT: NC/AT, EOMI, PERRLA, MMM, clear conjunctiva and sclera, normal hearing, no nasal discharge, throat clear, no thrush, normal dentition.   NECK: supple, no JVD, no LAD, no thyromegaly  BACK:  ROM intact, no spinal/paraspinal tenderness  CV: S1S2, RRR, no mumur  RESP: good air movement, CTABL, no rales, rhonchi or wheezing, respirations unlabored  ABD: +BS, soft, ND, NT, no guarding, no rigidity, no HSM  EXT: +2 radial and pedal pulses, no edema, no calve tenderness  SKIN: No visible Rashes or Ulcers  MSK: ROM intact in all extremities  NEURO:  sensory and CN 2-12 Grossly intact, no motor deficits, no, saddle anesthesia, AOx3  PSYCH: normal behavior         LABS:                          11.8   9.85  )-----------( 257      ( 27 Apr 2024 06:06 )             35.8     27 Apr 2024 06:06    139    |  106    |  8      ----------------------------<  95     3.7     |  25     |  0.80     Ca    8.6        27 Apr 2024 06:06    TPro  6.6    /  Alb  3.0    /  TBili  0.6    /  DBili  x      /  AST  51     /  ALT  68     /  AlkPhos  75     27 Apr 2024 06:06    LIVER FUNCTIONS - ( 27 Apr 2024 06:06 )  Alb: 3.0 g/dL / Pro: 6.6 g/dL / ALK PHOS: 75 U/L / ALT: 68 U/L / AST: 51 U/L / GGT: x           PT/INR - ( 26 Apr 2024 15:00 )   PT: 13.1 sec;   INR: 1.15 ratio         PTT - ( 26 Apr 2024 15:00 )  PTT:30.3 sec  CAPILLARY BLOOD GLUCOSE            Urinalysis Basic - ( 27 Apr 2024 06:06 )    Color: x / Appearance: x / SG: x / pH: x  Gluc: 95 mg/dL / Ketone: x  / Bili: x / Urobili: x   Blood: x / Protein: x / Nitrite: x   Leuk Esterase: x / RBC: x / WBC x   Sq Epi: x / Non Sq Epi: x / Bacteria: x        RADIOLOGY:

## 2024-04-30 NOTE — PROGRESS NOTE ADULT - ATTENDING COMMENTS
agree with above  continue iv zosyn  f/u cultures  pain improving  uro recs noted  planning for renal sono on monday
agree with above  renal US today shows possible evolving abscess   continue zosyn  f/u UCx sensitivity  f/u Uro recs regarding sono  ID consulted  f/u am labs
agree with above  switch to PO abx  outpt f/u with PCP and urology
agree with above  continue iv abx for pyelo  f/u renal on on 4/29  f/u am labs  clinically improving

## 2024-05-01 LAB
CULTURE RESULTS: SIGNIFICANT CHANGE UP
CULTURE RESULTS: SIGNIFICANT CHANGE UP
SPECIMEN SOURCE: SIGNIFICANT CHANGE UP
SPECIMEN SOURCE: SIGNIFICANT CHANGE UP

## 2024-05-02 ENCOUNTER — NON-APPOINTMENT (OUTPATIENT)
Age: 34
End: 2024-05-02

## 2024-05-10 ENCOUNTER — APPOINTMENT (OUTPATIENT)
Dept: FAMILY MEDICINE | Facility: HOSPITAL | Age: 34
End: 2024-05-10

## 2024-05-11 ENCOUNTER — APPOINTMENT (OUTPATIENT)
Dept: FAMILY MEDICINE | Facility: HOSPITAL | Age: 34
End: 2024-05-11

## 2024-05-11 ENCOUNTER — OUTPATIENT (OUTPATIENT)
Dept: OUTPATIENT SERVICES | Facility: HOSPITAL | Age: 34
LOS: 1 days | End: 2024-05-11
Payer: SELF-PAY

## 2024-05-11 VITALS
OXYGEN SATURATION: 99 % | WEIGHT: 127 LBS | RESPIRATION RATE: 14 BRPM | HEART RATE: 75 BPM | SYSTOLIC BLOOD PRESSURE: 121 MMHG | BODY MASS INDEX: 24 KG/M2 | TEMPERATURE: 98.3 F | DIASTOLIC BLOOD PRESSURE: 71 MMHG

## 2024-05-11 DIAGNOSIS — Z00.00 ENCOUNTER FOR GENERAL ADULT MEDICAL EXAMINATION WITHOUT ABNORMAL FINDINGS: ICD-10-CM

## 2024-05-11 DIAGNOSIS — Z09 ENCOUNTER FOR FOLLOW-UP EXAMINATION AFTER COMPLETED TREATMENT FOR CONDITIONS OTHER THAN MALIGNANT NEOPLASM: ICD-10-CM

## 2024-05-11 DIAGNOSIS — N12 TUBULO-INTERSTITIAL NEPHRITIS, NOT SPECIFIED AS ACUTE OR CHRONIC: ICD-10-CM

## 2024-05-11 PROCEDURE — G0463: CPT

## 2024-05-13 ENCOUNTER — APPOINTMENT (OUTPATIENT)
Dept: UROLOGY | Facility: CLINIC | Age: 34
End: 2024-05-13

## 2024-05-16 NOTE — INTERPRETER SERVICES
[Other: ______] : provided by [employer] [Interpreters_IDNumber] : [id number] [Interpreters_FullName] : Hank

## 2024-05-16 NOTE — PHYSICAL EXAM
[No Acute Distress] : no acute distress [Well Developed] : well developed [Normal Sclera/Conjunctiva] : normal sclera/conjunctiva [EOMI] : extraocular movements intact [Normal Rate] : normal rate  [Regular Rhythm] : with a regular rhythm [Normal S1, S2] : normal S1 and S2 [No Edema] : there was no peripheral edema [Soft] : abdomen soft [Non Tender] : non-tender [Non-distended] : non-distended [Normal Bowel Sounds] : normal bowel sounds [de-identified] : no CAV tenderness [de-identified] : no suprapubic tenderness

## 2024-05-16 NOTE — HISTORY OF PRESENT ILLNESS
[FreeTextEntry1] : pyelonephritis  [de-identified] : 33 y/o F admitted to [facility] from 4/26 to 4/30 for pyelonephritis with abscess. Evaluated by uro: too small to be drained. DC on PO ceftin for 3 weeks.  Plan to repeat US renal outpatient in 3 to 4 weeks. Follow with uro : Dr. Ramirez in 2 weeks   Reports meds compliance, finished 1.5 weeks.  Denies any dysuria, burning sensation with urination, urinary frequency, or other urinary symptoms, denies fever, chills, flank pain. Denies chest pain, SOB, abdominal pain, diarrhea, or other concerns.

## 2024-05-17 ENCOUNTER — OUTPATIENT (OUTPATIENT)
Dept: OUTPATIENT SERVICES | Facility: HOSPITAL | Age: 34
LOS: 1 days | End: 2024-05-17
Payer: SELF-PAY

## 2024-05-17 ENCOUNTER — RESULT REVIEW (OUTPATIENT)
Age: 34
End: 2024-05-17

## 2024-05-17 DIAGNOSIS — Z78.9 OTHER SPECIFIED HEALTH STATUS: Chronic | ICD-10-CM

## 2024-05-17 DIAGNOSIS — Z00.00 ENCOUNTER FOR GENERAL ADULT MEDICAL EXAMINATION WITHOUT ABNORMAL FINDINGS: ICD-10-CM

## 2024-05-17 DIAGNOSIS — N12 TUBULO-INTERSTITIAL NEPHRITIS, NOT SPECIFIED AS ACUTE OR CHRONIC: ICD-10-CM

## 2024-05-17 PROCEDURE — 76770 US EXAM ABDO BACK WALL COMP: CPT

## 2024-05-17 PROCEDURE — 76770 US EXAM ABDO BACK WALL COMP: CPT | Mod: 26

## 2024-06-01 ENCOUNTER — APPOINTMENT (OUTPATIENT)
Dept: FAMILY MEDICINE | Facility: HOSPITAL | Age: 34
End: 2024-06-01

## 2024-06-01 ENCOUNTER — OUTPATIENT (OUTPATIENT)
Dept: OUTPATIENT SERVICES | Facility: HOSPITAL | Age: 34
LOS: 1 days | End: 2024-06-01
Payer: SELF-PAY

## 2024-06-01 VITALS
HEART RATE: 64 BPM | SYSTOLIC BLOOD PRESSURE: 104 MMHG | OXYGEN SATURATION: 99 % | TEMPERATURE: 98.1 F | RESPIRATION RATE: 14 BRPM | DIASTOLIC BLOOD PRESSURE: 68 MMHG

## 2024-06-01 DIAGNOSIS — N12 TUBULO-INTERSTITIAL NEPHRITIS, NOT SPECIFIED AS ACUTE OR CHRONIC: ICD-10-CM

## 2024-06-01 DIAGNOSIS — Z00.00 ENCOUNTER FOR GENERAL ADULT MEDICAL EXAMINATION WITHOUT ABNORMAL FINDINGS: ICD-10-CM

## 2024-06-01 DIAGNOSIS — Z78.9 OTHER SPECIFIED HEALTH STATUS: Chronic | ICD-10-CM

## 2024-06-01 DIAGNOSIS — N20.0 CALCULUS OF KIDNEY: ICD-10-CM

## 2024-06-01 PROCEDURE — G0463: CPT

## 2024-06-01 RX ORDER — CEFUROXIME AXETIL 500 MG/1
500 TABLET ORAL
Refills: 0 | Status: COMPLETED | COMMUNITY
Start: 2024-05-11 | End: 2024-06-01

## 2024-06-10 ENCOUNTER — OUTPATIENT (OUTPATIENT)
Dept: OUTPATIENT SERVICES | Facility: HOSPITAL | Age: 34
LOS: 1 days | End: 2024-06-10
Payer: SELF-PAY

## 2024-06-10 ENCOUNTER — APPOINTMENT (OUTPATIENT)
Dept: UROLOGY | Facility: HOSPITAL | Age: 34
End: 2024-06-10

## 2024-06-10 VITALS
BODY MASS INDEX: 24.56 KG/M2 | TEMPERATURE: 97.8 F | HEART RATE: 67 BPM | RESPIRATION RATE: 14 BRPM | DIASTOLIC BLOOD PRESSURE: 79 MMHG | WEIGHT: 130 LBS | SYSTOLIC BLOOD PRESSURE: 125 MMHG | OXYGEN SATURATION: 100 %

## 2024-06-10 DIAGNOSIS — R30.0 DYSURIA: ICD-10-CM

## 2024-06-10 DIAGNOSIS — N15.1 RENAL AND PERINEPHRIC ABSCESS: ICD-10-CM

## 2024-06-10 DIAGNOSIS — Z78.9 OTHER SPECIFIED HEALTH STATUS: Chronic | ICD-10-CM

## 2024-06-10 PROCEDURE — G0463: CPT

## 2024-06-10 NOTE — HISTORY OF PRESENT ILLNESS
[FreeTextEntry1] : 34-year-old female with no significant past medical history presenting after being hospitalized at [facility]. The patient was found to have a 2.2cm right renal abscess during this admission and was growing E.coli in her urine. RBUS done 2-3 weeks after showed no significant findings of the abscess with a possible stone, not well seen on initial CT scan. Today, patient denies flank pain dysuria, hematuria, fevers/chills and overall feels back to her normal baseline.

## 2024-06-10 NOTE — PHYSICAL EXAM
[General Appearance - Well Developed] : well developed [General Appearance - Well Nourished] : well nourished [] : no respiratory distress [Normal Station and Gait] : the gait and station were normal for the patient's age [Skin Color & Pigmentation] : normal skin color and pigmentation [No Focal Deficits] : no focal deficits [Not Anxious] : not anxious

## 2024-06-10 NOTE — ASSESSMENT
[FreeTextEntry1] : 34 year old female presenting as a post-hospitalization visit for right renal abscess  Plan: - CT renal mass protocol to eval for right renal abscess and possible stones - Will call patient back with CT results

## 2024-06-13 ENCOUNTER — OUTPATIENT (OUTPATIENT)
Dept: OUTPATIENT SERVICES | Facility: HOSPITAL | Age: 34
LOS: 1 days | End: 2024-06-13
Payer: SELF-PAY

## 2024-06-13 ENCOUNTER — APPOINTMENT (OUTPATIENT)
Dept: DERMATOLOGY | Facility: HOSPITAL | Age: 34
End: 2024-06-13
Payer: COMMERCIAL

## 2024-06-13 VITALS
OXYGEN SATURATION: 100 % | TEMPERATURE: 97.4 F | HEART RATE: 66 BPM | SYSTOLIC BLOOD PRESSURE: 120 MMHG | DIASTOLIC BLOOD PRESSURE: 76 MMHG | RESPIRATION RATE: 14 BRPM

## 2024-06-13 DIAGNOSIS — L85.3 XEROSIS CUTIS: ICD-10-CM

## 2024-06-13 DIAGNOSIS — D23.9 OTHER BENIGN NEOPLASM OF SKIN, UNSPECIFIED: ICD-10-CM

## 2024-06-13 DIAGNOSIS — Z78.9 OTHER SPECIFIED HEALTH STATUS: Chronic | ICD-10-CM

## 2024-06-13 DIAGNOSIS — L29.9 PRURITUS, UNSPECIFIED: ICD-10-CM

## 2024-06-13 DIAGNOSIS — L91.8 OTHER HYPERTROPHIC DISORDERS OF THE SKIN: ICD-10-CM

## 2024-06-13 DIAGNOSIS — L98.9 DISORDER OF THE SKIN AND SUBCUTANEOUS TISSUE, UNSPECIFIED: ICD-10-CM

## 2024-06-13 PROCEDURE — 99203 OFFICE O/P NEW LOW 30 MIN: CPT

## 2024-06-13 PROCEDURE — G0463: CPT

## 2024-06-13 RX ORDER — TRIAMCINOLONE ACETONIDE 1 MG/G
0.1 OINTMENT TOPICAL
Qty: 1 | Refills: 1 | Status: ACTIVE | COMMUNITY
Start: 2024-06-13 | End: 1900-01-01

## 2024-06-15 NOTE — HISTORY OF PRESENT ILLNESS
[FreeTextEntry1] : NP moles on neck, rash on back [de-identified] : 06/13/2024 09:09 AM  MILAGROS ANDREWS is a 34 year F presenting today for evaluation of the following:  # moles on the back since she was little, growing a little, otherwise asx  # itchiness on the back with some dark spots  # skin tags on neck  # dry feet  No personal or family history of skin cancer

## 2024-06-15 NOTE — PHYSICAL EXAM
[Declined] : declined [FreeTextEntry3] : dark evenly pigmented papule on the left shoulder two dome-shaped tan soft papules on the right upper back and mid lower back ill-defined hyperpigmented patches on the back small pedunculated papules on the neck xerosis on feet

## 2024-06-15 NOTE — END OF VISIT
[] : Resident [FreeTextEntry3] : I personally saw and examined this patient with the resident physician and was present for the key portions of history taking, examination as well as any procedures performed .  I agree with the assessment and plan as documented in the resident's note unless noted below.   Romel Smith MD Physician, Dermatology and Dermatologic Surgery [facility]

## 2024-06-15 NOTE — ASSESSMENT
[FreeTextEntry1] : # Pruritus on back with hyperpigmentation - gentle skin care discussed - START triamcinolone 0.1% ointment to affected areas twice a day PRN itchiness for 2 weeks on 1 week off, avoid face and groin.  Proper medication use and side effects discussed, including cutaneous atrophy, telangiectasias, and striae. Avoid long-term use.  # SKin tags on neck - Benign, no treatment warranted  # IDNs on shoulder and back - Benign, no treatment warranted  # Xerosis of feet - no signs of tinea - recommend OTC Amalctin or urea cream  RTC as needed   Reason MD Luis Physician, Dermatology & Dermatologic Surgery [facility]

## 2024-06-17 DIAGNOSIS — L91.8 OTHER HYPERTROPHIC DISORDERS OF THE SKIN: ICD-10-CM

## 2024-06-17 DIAGNOSIS — L29.9 PRURITUS, UNSPECIFIED: ICD-10-CM

## 2024-06-17 DIAGNOSIS — L85.3 XEROSIS CUTIS: ICD-10-CM

## 2024-06-17 DIAGNOSIS — D23.9 OTHER BENIGN NEOPLASM OF SKIN, UNSPECIFIED: ICD-10-CM

## 2024-06-19 ENCOUNTER — OUTPATIENT (OUTPATIENT)
Dept: OUTPATIENT SERVICES | Facility: HOSPITAL | Age: 34
LOS: 1 days | End: 2024-06-19
Payer: SELF-PAY

## 2024-06-19 ENCOUNTER — APPOINTMENT (OUTPATIENT)
Dept: CT IMAGING | Facility: HOSPITAL | Age: 34
End: 2024-06-19
Payer: COMMERCIAL

## 2024-06-19 DIAGNOSIS — Z78.9 OTHER SPECIFIED HEALTH STATUS: Chronic | ICD-10-CM

## 2024-06-19 DIAGNOSIS — N15.1 RENAL AND PERINEPHRIC ABSCESS: ICD-10-CM

## 2024-06-19 PROCEDURE — 74178 CT ABD&PLV WO CNTR FLWD CNTR: CPT | Mod: 26

## 2024-06-19 PROCEDURE — 74178 CT ABD&PLV WO CNTR FLWD CNTR: CPT

## 2024-06-24 ENCOUNTER — NON-APPOINTMENT (OUTPATIENT)
Age: 34
End: 2024-06-24

## 2024-06-24 DIAGNOSIS — N20.0 CALCULUS OF KIDNEY: ICD-10-CM

## 2024-06-24 NOTE — HISTORY OF PRESENT ILLNESS
[FreeTextEntry1] : fu  [de-identified] : 35 yo F with Hx of recent admission for pyelonephritis with renal abscess, has completed extended course of abx. Abscess not well visualized on fu outpatient sonno, 0.7 cm non obstructing R renal calculus, CT recommended, pt has upcoming darek with uro on 6/10. Denies dysuria, frequency or hematuria.

## 2024-12-04 NOTE — ED PROVIDER NOTE - SUSPICION OF SEPSIS AT
[facility] Emergency Department  [address]  [place] [address]  Phone: [phone]  Fax: [phone]    EMERGENCY DEPARTMENT ENCOUNTER          Pt Name: Manda Doll  MRN: [id number]  Birthdate 1944  Date of evaluation: 12/4/2024      CHIEF COMPLAINT       Chief Complaint   Patient presents with    Cough     Dry cough for few days.  Cough now with yellow sputum and shortness of breath and low Sp02 checks at home.        HISTORY OF PRESENT ILLNESS       Manda Doll is a 80 y.o. female who presents with a cough that began yesterday with some productive sputum.  Does have a history of asthma.  Does take daily asthma medications but typically does not need or use albuterol.  Had some labs drawn recently by her PCP that were within normal limits.  No chest discomfort but does report some mild dyspnea.  Checked her pulse ox at home that was in the lower 90s.  History of bronchitis in the past that required antibiotics.  Denies any other symptoms or concerns at this time.    REVIEW OF SYSTEMS       Review of Systems   Constitutional:  Negative for chills, fatigue and fever.   HENT:  Negative for rhinorrhea and sore throat.    Eyes:  Negative for pain.   Respiratory:  Positive for cough and shortness of breath.    Cardiovascular:  Negative for chest pain.   Gastrointestinal:  Negative for abdominal pain, diarrhea, nausea and vomiting.   Genitourinary:  Negative for difficulty urinating.   Musculoskeletal:  Negative for back pain and neck pain.   Skin:  Negative for rash.   Neurological:  Negative for weakness and headaches.        PAST MEDICAL HISTORY    has a past medical history of Asthma, CAD (coronary artery disease), Cancer ([facility]), DVT (deep venous thrombosis) ([facility]), Factor 5 Leiden mutation, heterozygous ([facility]), Gout, Head injury, Hearing loss, Hearing loss, Hx of blood clots, Hyperlipidemia, Hypertension, Melanoma ([facility]), Osteoarthritis, Pulmonary emboli ([facility]),  26-Apr-2024 14:29

## 2025-04-16 ENCOUNTER — MED ADMIN CHARGE (OUTPATIENT)
Age: 35
End: 2025-04-16

## 2025-04-16 ENCOUNTER — APPOINTMENT (OUTPATIENT)
Age: 35
End: 2025-04-16

## 2025-04-16 ENCOUNTER — OUTPATIENT (OUTPATIENT)
Dept: OUTPATIENT SERVICES | Facility: HOSPITAL | Age: 35
LOS: 1 days | End: 2025-04-16
Payer: SELF-PAY

## 2025-04-16 VITALS
DIASTOLIC BLOOD PRESSURE: 70 MMHG | BODY MASS INDEX: 24.35 KG/M2 | OXYGEN SATURATION: 98 % | WEIGHT: 129 LBS | RESPIRATION RATE: 14 BRPM | TEMPERATURE: 98.4 F | SYSTOLIC BLOOD PRESSURE: 110 MMHG | HEART RATE: 74 BPM | HEIGHT: 61 IN

## 2025-04-16 DIAGNOSIS — Z23 ENCOUNTER FOR IMMUNIZATION: ICD-10-CM

## 2025-04-16 DIAGNOSIS — Z12.4 ENCOUNTER FOR SCREENING FOR MALIGNANT NEOPLASM OF CERVIX: ICD-10-CM

## 2025-04-16 DIAGNOSIS — Z78.9 OTHER SPECIFIED HEALTH STATUS: Chronic | ICD-10-CM

## 2025-04-16 DIAGNOSIS — Z00.00 ENCOUNTER FOR GENERAL ADULT MEDICAL EXAMINATION WITHOUT ABNORMAL FINDINGS: ICD-10-CM

## 2025-04-16 DIAGNOSIS — Z00.00 ENCOUNTER FOR GENERAL ADULT MEDICAL EXAMINATION W/OUT ABNORMAL FINDINGS: ICD-10-CM

## 2025-04-17 LAB
ALBUMIN SERPL ELPH-MCNC: 4.5 G/DL
ALP BLD-CCNC: 74 U/L
ALT SERPL-CCNC: 21 U/L
ANION GAP SERPL CALC-SCNC: 12 MMOL/L
AST SERPL-CCNC: 18 U/L
BILIRUB SERPL-MCNC: 0.4 MG/DL
BUN SERPL-MCNC: 16 MG/DL
CALCIUM SERPL-MCNC: 9.5 MG/DL
CHLORIDE SERPL-SCNC: 104 MMOL/L
CHOLEST SERPL-MCNC: 179 MG/DL
CO2 SERPL-SCNC: 22 MMOL/L
CREAT SERPL-MCNC: 0.76 MG/DL
EGFRCR SERPLBLD CKD-EPI 2021: 103 ML/MIN/1.73M2
ESTIMATED AVERAGE GLUCOSE: 108 MG/DL
GLUCOSE SERPL-MCNC: 97 MG/DL
HBA1C MFR BLD HPLC: 5.4 %
HCT VFR BLD CALC: 41.1 %
HCV AB SER QL: NONREACTIVE
HCV S/CO RATIO: 0.19 S/CO
HDLC SERPL-MCNC: 67 MG/DL
HGB BLD-MCNC: 13.2 G/DL
HIV1+2 AB SPEC QL IA.RAPID: NONREACTIVE
HPV HIGH+LOW RISK DNA PNL CVX: NOT DETECTED
LDLC SERPL-MCNC: 101 MG/DL
MCHC RBC-ENTMCNC: 29 PG
MCHC RBC-ENTMCNC: 32.1 G/DL
MCV RBC AUTO: 90.3 FL
NONHDLC SERPL-MCNC: 112 MG/DL
PLATELET # BLD AUTO: 325 K/UL
POTASSIUM SERPL-SCNC: 4.9 MMOL/L
PROT SERPL-MCNC: 7.3 G/DL
RBC # BLD: 4.55 M/UL
RBC # FLD: 13.2 %
SODIUM SERPL-SCNC: 139 MMOL/L
TRIGL SERPL-MCNC: 62 MG/DL
TSH SERPL-ACNC: 1.28 UIU/ML
WBC # FLD AUTO: 5.23 K/UL

## 2025-04-17 PROCEDURE — 80061 LIPID PANEL: CPT

## 2025-04-17 PROCEDURE — G0463: CPT

## 2025-04-17 PROCEDURE — 84443 ASSAY THYROID STIM HORMONE: CPT

## 2025-04-17 PROCEDURE — 87624 HPV HI-RISK TYP POOLED RSLT: CPT

## 2025-04-17 PROCEDURE — 80053 COMPREHEN METABOLIC PANEL: CPT

## 2025-04-17 PROCEDURE — 86803 HEPATITIS C AB TEST: CPT

## 2025-04-17 PROCEDURE — 85027 COMPLETE CBC AUTOMATED: CPT

## 2025-04-17 PROCEDURE — 83036 HEMOGLOBIN GLYCOSYLATED A1C: CPT

## 2025-04-17 PROCEDURE — 87389 HIV-1 AG W/HIV-1&-2 AB AG IA: CPT

## 2025-04-21 LAB — CYTOLOGY CVX/VAG DOC THIN PREP: NORMAL

## 2025-08-04 ENCOUNTER — EMERGENCY (EMERGENCY)
Facility: HOSPITAL | Age: 35
LOS: 1 days | End: 2025-08-04
Attending: EMERGENCY MEDICINE | Admitting: EMERGENCY MEDICINE
Payer: COMMERCIAL

## 2025-08-04 VITALS
RESPIRATION RATE: 18 BRPM | HEART RATE: 74 BPM | HEIGHT: 60 IN | WEIGHT: 128.97 LBS | OXYGEN SATURATION: 100 % | SYSTOLIC BLOOD PRESSURE: 119 MMHG | DIASTOLIC BLOOD PRESSURE: 77 MMHG | TEMPERATURE: 97 F

## 2025-08-04 DIAGNOSIS — Z78.9 OTHER SPECIFIED HEALTH STATUS: Chronic | ICD-10-CM

## 2025-08-04 PROCEDURE — 96372 THER/PROPH/DIAG INJ SC/IM: CPT

## 2025-08-04 PROCEDURE — 72100 X-RAY EXAM L-S SPINE 2/3 VWS: CPT | Mod: 26

## 2025-08-04 PROCEDURE — 72100 X-RAY EXAM L-S SPINE 2/3 VWS: CPT

## 2025-08-04 PROCEDURE — 99284 EMERGENCY DEPT VISIT MOD MDM: CPT

## 2025-08-04 PROCEDURE — 99284 EMERGENCY DEPT VISIT MOD MDM: CPT | Mod: 25

## 2025-08-04 RX ORDER — METHOCARBAMOL 500 MG/1
750 TABLET, FILM COATED ORAL ONCE
Refills: 0 | Status: COMPLETED | OUTPATIENT
Start: 2025-08-04 | End: 2025-08-04

## 2025-08-04 RX ORDER — METHOCARBAMOL 500 MG/1
1 TABLET, FILM COATED ORAL
Qty: 15 | Refills: 0
Start: 2025-08-04 | End: 2025-08-08

## 2025-08-04 RX ORDER — OXYCODONE HYDROCHLORIDE AND ACETAMINOPHEN 10; 325 MG/1; MG/1
1 TABLET ORAL ONCE
Refills: 0 | Status: DISCONTINUED | OUTPATIENT
Start: 2025-08-04 | End: 2025-08-04

## 2025-08-04 RX ORDER — KETOROLAC TROMETHAMINE 30 MG/ML
30 INJECTION, SOLUTION INTRAMUSCULAR; INTRAVENOUS ONCE
Refills: 0 | Status: DISCONTINUED | OUTPATIENT
Start: 2025-08-04 | End: 2025-08-04

## 2025-08-04 RX ORDER — LIDOCAINE HYDROCHLORIDE 20 MG/ML
1 JELLY TOPICAL
Qty: 3 | Refills: 0
Start: 2025-08-04 | End: 2025-08-08

## 2025-08-04 RX ORDER — OXYCODONE HYDROCHLORIDE AND ACETAMINOPHEN 10; 325 MG/1; MG/1
1 TABLET ORAL
Qty: 8 | Refills: 0
Start: 2025-08-04 | End: 2025-08-05

## 2025-08-04 RX ORDER — LIDOCAINE HYDROCHLORIDE 20 MG/ML
1 JELLY TOPICAL ONCE
Refills: 0 | Status: COMPLETED | OUTPATIENT
Start: 2025-08-04 | End: 2025-08-04

## 2025-08-04 RX ORDER — IBUPROFEN 200 MG
1 TABLET ORAL
Qty: 20 | Refills: 0
Start: 2025-08-04 | End: 2025-08-08

## 2025-08-04 RX ADMIN — KETOROLAC TROMETHAMINE 30 MILLIGRAM(S): 30 INJECTION, SOLUTION INTRAMUSCULAR; INTRAVENOUS at 12:06

## 2025-08-04 RX ADMIN — OXYCODONE HYDROCHLORIDE AND ACETAMINOPHEN 1 TABLET(S): 10; 325 TABLET ORAL at 12:05

## 2025-08-04 RX ADMIN — LIDOCAINE HYDROCHLORIDE 1 PATCH: 20 JELLY TOPICAL at 12:06

## 2025-08-04 RX ADMIN — METHOCARBAMOL 750 MILLIGRAM(S): 500 TABLET, FILM COATED ORAL at 12:05

## 2025-09-13 ENCOUNTER — APPOINTMENT (OUTPATIENT)
Age: 35
End: 2025-09-13